# Patient Record
Sex: FEMALE | Race: BLACK OR AFRICAN AMERICAN | Employment: OTHER | URBAN - METROPOLITAN AREA
[De-identification: names, ages, dates, MRNs, and addresses within clinical notes are randomized per-mention and may not be internally consistent; named-entity substitution may affect disease eponyms.]

---

## 2017-01-11 LAB
T4 FREE SERPL-MCNC: 1.37 NG/DL (ref 0.82–1.77)
TSH SERPL DL<=0.005 MIU/L-ACNC: 0.04 UIU/ML (ref 0.45–4.5)

## 2017-03-01 DIAGNOSIS — E05.90 HYPERTHYROIDISM: ICD-10-CM

## 2017-03-01 DIAGNOSIS — E05.20 TOXIC MULTINODULAR GOITER: ICD-10-CM

## 2017-03-01 RX ORDER — METHIMAZOLE 5 MG/1
TABLET ORAL
Qty: 90 TAB | Refills: 6 | Status: SHIPPED | OUTPATIENT
Start: 2017-03-01 | End: 2017-10-10 | Stop reason: SDUPTHER

## 2017-03-07 ENCOUNTER — OFFICE VISIT (OUTPATIENT)
Dept: ENDOCRINOLOGY | Age: 72
End: 2017-03-07

## 2017-03-07 VITALS
RESPIRATION RATE: 16 BRPM | HEART RATE: 61 BPM | DIASTOLIC BLOOD PRESSURE: 75 MMHG | SYSTOLIC BLOOD PRESSURE: 133 MMHG | WEIGHT: 201.3 LBS | BODY MASS INDEX: 37.04 KG/M2 | HEIGHT: 62 IN | TEMPERATURE: 97.5 F

## 2017-03-07 DIAGNOSIS — I10 ESSENTIAL HYPERTENSION: ICD-10-CM

## 2017-03-07 DIAGNOSIS — E05.90 HYPERTHYROIDISM: Primary | ICD-10-CM

## 2017-03-07 DIAGNOSIS — E05.20 TOXIC MULTINODULAR GOITER: ICD-10-CM

## 2017-03-07 NOTE — PROGRESS NOTES
[facility] ENDOCRINOLOGY               Yuliya Barone MD        [address] [address] NE:[phone] Fax [phone]           Patient Information  Date:3/11/2017  Name : Michelle Long 81 y.o.     YOB: 1945         Referred by: Maggy Manning MD         Chief Complaint   Patient presents with    Thyroid Problem    Ultrasound       History of present illness    Michelle Long is a 81 y.o. female  here for fu  of thyroid. She was referred by Dr. Eloina Willingham for evaluation and management of hyperthyroidism and nodular goiter. She was found to have hyperthyroidism, ultrasound showed multinodular goiter, left dominant nodule  status post fine needle aspiration biopsy, which was benign by cytopathology. She is on Methimazole. US was last year     No change in the size of the neck    no fever or sore throat     No history of atrial fibrillation, known osteoporosis. No FH of thyroid cancer     Past Medical History:   Diagnosis Date    Arthritis     Hypertension     Hyperthyroidism        Current Outpatient Prescriptions   Medication Sig    methIMAzole (TAPAZOLE) 5 mg tablet TAKE 1 TABLET BY MOUTH EVERY MORNING    fluticasone (FLONASE) 50 mcg/actuation nasal spray 2 Sprays by Both Nostrils route once.  potassium chloride (K-DUR, KLOR-CON) 20 mEq tablet Take 20 mEq by mouth daily.  beclomethasone (QVAR) 80 mcg/actuation inhaler Take 1 Puff by inhalation two (2) times a day.  triamcinolone acetonide (KENALOG) 0.1 % ointment Apply  to affected area two (2) times a day. use thin layer    ALPRAZolam (XANAX) 0.5 mg tablet Take 1 Tab by mouth two (2) times a day. Max Daily Amount: 1 mg.  cetirizine (ZYRTEC) 10 mg tablet Take 10 mg by mouth daily.  amLODIPine (NORVASC) 5 mg tablet Take 5 mg by mouth daily.  losartan (COZAAR) 100 mg tablet Take 100 mg by mouth daily.  propranolol LA (INDERAL LA) 120 mg SR capsule Take 120 mg by mouth daily. No current facility-administered medications for this visit. Review of Systems:  - Constitutional Symptoms: no fevers, chills  - Eyes: no blurry vision or double vision  -   - Integumentary: no rashes  - Neurological: no numbness, tingling, or headaches  - Psychiatric: no depression or anxiety  - Endocrine:  no polyuria or polydipsia    Physical Examination:  - Blood pressure 133/75, pulse 61, temperature 97.5 °F (36.4 °C), temperature source Oral, resp. rate 16, height 5' 2\" (1.575 m), weight 201 lb 4.8 oz (91.3 kg). Body mass index is 36.82 kg/(m^2). - General: pleasant, no distress, good eye contact  - HEENT: no exopthalmos, no periorbital edema, no scleral/conjunctival injection, EOMI, no lid lag or stare  - Neck: supple, + thyromegaly,left  nodule,   - Cardiovascular: regular,  normal S1 and S2, no murmurs  - Respiratory: clear to auscultation bilaterally  - Gastrointestinal: soft, nontender, nondistended, BS +  - Musculoskeletal:  no tremors, no edema  - Neurological: alert and oriented   - Psychiatric: normal mood and affect  - Skin - normal turgor    Data Reviewed:       [] Reviewed labs    Assessment/Plan:     1. Hyperthyroidism    2. Toxic multinodular goiter    3. Essential hypertension        Toxic MNG   Methimazole 5 mg   Left dominant nodule FNA benign by cytopathology , was done at [facility]- radioactive thyroid is a better option than Methimazole in her case. Declined CHAMBERS in the past   Left nodule has increased to 3 cm from 2.3 cm  Agreed for CHAMBERS   US 3/17  There are 3 dominant nodules - isthmus and 2 left nodules     Follow-up Disposition:  Return in about 3 months (around 6/7/2017). Thank you for allowing me to participate in the care of this patient.     Jeny Tan MD      Patient verbalized understanding

## 2017-03-07 NOTE — MR AVS SNAPSHOT
Visit Information Date & Time Provider Department Dept. Phone Encounter #  
 3/7/2017 10:00 AM Venus Singh MD [facility] [phone] [id number] Follow-up Instructions Return in about 3 months (around 6/7/2017). Upcoming Health Maintenance Date Due Hepatitis C Screening 1945 DTaP/Tdap/Td series (1 - Tdap) 3/5/1968 BREAST CANCER SCRN MAMMOGRAM 3/5/1997 FOBT Q 1 YEAR AGE 50-75 3/5/1997 ZOSTER VACCINE AGE 60> 3/5/2007 GLAUCOMA SCREENING Q2Y 3/5/2012 OSTEOPOROSIS SCREENING (DEXA) 3/5/2012 Pneumococcal 65+ Low/Medium Risk (1 of 2 - PCV13) 3/5/2012 MEDICARE YEARLY EXAM 3/5/2012 INFLUENZA AGE 9 TO ADULT 8/1/2016 Allergies as of 3/7/2017  Review Complete On: 3/7/2017 By: Venus Singh MD  
 No Known Allergies Current Immunizations  Never Reviewed No immunizations on file. Not reviewed this visit You Were Diagnosed With   
  
 Codes Comments Hyperthyroidism    -  Primary ICD-10-CM: E05.90 ICD-9-CM: 242.90 Toxic multinodular goiter     ICD-10-CM: E05.20 ICD-9-CM: 242.20 Vitals BP Pulse Temp Resp Height(growth percentile) Weight(growth percentile) 133/75 (BP 1 Location: Left arm, BP Patient Position: Sitting) 61 97.5 °F (36.4 °C) (Oral) 16 5' 2\" (1.575 m) 201 lb 4.8 oz (91.3 kg) BMI OB Status Smoking Status 36.82 kg/m2 Menopause Former Smoker Vitals History BMI and BSA Data Body Mass Index Body Surface Area  
 36.82 kg/m 2 2 m 2 Preferred Pharmacy Pharmacy Name Phone 99 [facility], [address] [place] [address] [phone] Your Updated Medication List  
  
   
This list is accurate as of: 3/7/17 11:47 AM.  Always use your most recent med list.  
  
  
  
  
 ALPRAZolam 0.5 mg tablet Commonly known as:  Alcus Sweeney Take 1 Tab by mouth two (2) times a day. Max Daily Amount: 1 mg. amLODIPine 5 mg tablet Commonly known as:  Sunil Wills Take 5 mg by mouth daily. beclomethasone 80 mcg/actuation inhaler Commonly known as:  QVAR Take 1 Puff by inhalation two (2) times a day. cetirizine 10 mg tablet Commonly known as:  ZYRTEC Take 10 mg by mouth daily. fluticasone 50 mcg/actuation nasal spray Commonly known as:  Luzmaria Fryer 2 Sprays by Both Nostrils route once. losartan 100 mg tablet Commonly known as:  COZAAR Take 100 mg by mouth daily. methIMAzole 5 mg tablet Commonly known as:  TAPAZOLE  
TAKE 1 TABLET BY MOUTH EVERY MORNING  
  
 potassium chloride 20 mEq tablet Commonly known as:  K-DUR, KLOR-CON Take 20 mEq by mouth daily. propranolol  mg SR capsule Commonly known as:  INDERAL LA Take 120 mg by mouth daily. triamcinolone acetonide 0.1 % ointment Commonly known as:  KENALOG Apply  to affected area two (2) times a day. use thin layer Follow-up Instructions Return in about 3 months (around 6/7/2017). Patient Instructions Radioactive Iodine: What to Expect at [facility] Your Recovery Radioactive iodine is absorbed and concentrated by the thyroid gland. You get it in liquid or pill form. The radiation will pass out of your body through your urine within days. Until that time, you will give off radiation in your sweat, your saliva, your urine, and anything else that comes out of your body. It is important to avoid exposing other people to the radioactivity from your body. Your doctor will give you more written instructions. Follow these carefully. The instructions will tell you how far to stay away from people and how long you need to follow the precautions listed below. They will list other ways to keep other people safe. They will also tell you when it will be safe to go out, go to work, and do other activities. How can you care for yourself at home? General recommendations · For a period of time, you will need to keep your distance from other people, especially young children and pregnant women. · Avoid close contact, kissing, and sexual activity. You may need to sleep in a separate bed from your partner. · Keep the toilet very clean. Men should urinate sitting down to avoid splashing. Flush the toilet 2 or 3 times after each use. Wash your hands well with soap and lots of water each time you use the toilet. · Rinse the bathroom sink and tub well after you use them. · Use separate towels, washcloths, and sheets. Wash these and your personal clothing by themselves. Don't wash them with other people's laundry. · You may want to use a special plastic trash bag for all your trash, such as bandages, paper or plastic dishes, menstrual pads, tissues, or paper towels. Talk to your treatment facility to see if they will handle the disposal. Or after 80 days, this bag can be thrown out with your other trash. · Wash your dishes in a  or by hand. If you use disposable dishes, they must be thrown away in the special plastic trash bag. · Don't cook for other people. If you must cook, use plastic gloves. Then throw them away in the special plastic trash bag. Don't share cups, dishes, or utensils. Pregnancy and children · Your doctor will tell you when it is safe to have sex and become pregnant. · You should not breastfeed your baby after you have been treated with radioactive iodine. Ask your doctor when it's safe to breastfeed. Travel · Don't take public transportation. If you are able, it's best to drive yourself. · It is important to prepare for any problems you may have at airport security. People who have had radioactive iodine treatment can set off the radiation detection machines in airports for a week to 10 days. Check with local authorities about any steps or permission you may need to travel.  
· If you plan to travel on the interstate, you may set off radiation detectors. Most police and transportation workers are aware of medical radiation, but it may help to carry some paperwork from your doctor. Follow-up care is a key part of your treatment and safety. Be sure to make and go to all appointments, and call your doctor if you are having problems. It's also a good idea to know your test results and keep a list of the medicines you take. When should you call for help? Watch closely for any changes in your health, and be sure to contact your doctor if: 
· You have a sore throat. · You vomit. · You have diarrhea. Where can you learn more? Go to http://tio-mago.info/. Enter S771 in the search box to learn more about \"Radioactive Iodine: What to Expect at Home. \" Current as of: July 28, 2016 Content Version: 11.1 © 8777-6671 [employer]. Care instructions adapted under license by [employer] (which disclaims liability or warranty for this information). If you have questions about a medical condition or this instruction, always ask your healthcare professional. [address] any warranty or liability for your use of this information. Thyroidectomy: Before Your Surgery What is a thyroidectomy? A thyroidectomy is surgery to take out your thyroid gland. This gland is shaped like a butterfly. It lies across the windpipe (trachea). The gland makes hormones that control how your body makes and uses energy (metabolism). A doctor removes the gland when it gets too big, does not work right, or has a tumor. Most tumors that grow in this gland are benign. This means they are not cancer. The doctor will take out the thyroid through a cut (incision) in the front of your neck. You will likely have a tube, called a drain, in your neck to let fluid out of the cut. The drain is most often taken out before you go home. You may go home on the same day.  Or you may stay one or more nights in the hospital. Butch Griffin may return to work or your normal routine in 1 to 2 weeks. This depends on whether you need more treatment and how you feel. It may also depend on the kind of work you do. Your doctor will check your incision in about a week. You may need to take thyroid medicine. If you have thyroid cancer, you may need to have radioactive iodine therapy. Your doctor will talk to you about what happens next. Follow-up care is a key part of your treatment and safety. Be sure to make and go to all appointments, and call your doctor if you are having problems. It's also a good idea to know your test results and keep a list of the medicines you take. What happens before surgery? Surgery can be stressful. This information will help you understand what you can expect. And it will help you safely prepare for surgery. Preparing for surgery · Understand exactly what surgery is planned, along with the risks, benefits, and other options. · Tell your doctors ALL the medicines, vitamins, supplements, and herbal remedies you take. Some of these can increase the risk of bleeding or interact with anesthesia. · If you take blood thinners, such as warfarin (Coumadin), clopidogrel (Plavix), or aspirin, be sure to talk to your doctor. He or she will tell you if you should stop taking these medicines before your surgery. Make sure that you understand exactly what your doctor wants you to do. · Your doctor will tell you which medicines to take or stop before your surgery. You may need to stop taking certain medicines a week or more before surgery. So talk to your doctor as soon as you can. · If you have an advance directive, let your doctor know. It may include a living will and a durable power of  for health care. Bring a copy to the hospital. If you don't have one, you may want to prepare one. It lets your doctor and loved ones know your health care wishes.  Doctors suggest that everyone prepare these papers before any type of surgery or procedure. What happens on the day of surgery? · Follow the instructions exactly about when to stop eating and drinking. If you don't, your surgery may be canceled. If your doctor told you to take your medicines on the day of surgery, take them with only a sip of water. · Take a bath or shower before you come in for your surgery. Do not apply lotions, perfumes, deodorants, or nail polish. · Do not shave the surgical site yourself. · Take off all jewelry and piercings. And take out contact lenses, if you wear them. At the hospital or surgery center · Bring a picture ID. · The area for surgery is often marked to make sure there are no errors. · You will be kept comfortable and safe by your anesthesia provider. You will be asleep during the surgery. · How long your surgery takes depends on how complex it is. Going home · Be sure you have someone to drive you home. Anesthesia and pain medicine make it unsafe for you to drive. · You will be given more specific instructions about recovering from your surgery. They will cover things like diet, wound care, follow-up care, driving, and getting back to your normal routine. When should you call your doctor? · You have questions or concerns. · You don't understand how to prepare for your surgery. · You become ill before the surgery (such as fever, flu, or a cold). · You need to reschedule or have changed your mind about having the surgery. Where can you learn more? Go to http://tio-mago.info/. Enter I132 in the search box to learn more about \"Thyroidectomy: Before Your Surgery. \" Current as of: July 28, 2016 Content Version: 11.1 © 0164-4099 [employer]. Care instructions adapted under license by [employer] (which disclaims liability or warranty for this information).  If you have questions about a medical condition or this instruction, always ask your healthcare professional. [address] any warranty or liability for your use of this information. Introducing [facility]! Basilia Mustafa introduces [employer]PSoftware patient portal. Now you can access parts of your medical record, email your doctor's office, and request medication refills online. 1. In your internet browser, go to [email or web address] 2. Click on the First Time User? Click Here link in the Sign In box. You will see the New Member Sign Up page. 3. Enter your [employer]PSoftware Access Code exactly as it appears below. You will not need to use this code after youve completed the sign-up process. If you do not sign up before the expiration date, you must request a new code. · [employer] Access Code: [email or web address] Expires: 6/5/2017 11:47 AM 
 
4. Enter the last four digits of your Social Security Number (xxxx) and Date of Birth (mm/dd/yyyy) as indicated and click Submit. You will be taken to the next sign-up page. 5. Create a [employer]PSoftware ID. This will be your [employer]Software login ID and cannot be changed, so think of one that is secure and easy to remember. 6. Create a [employer] password. You can change your password at any time. 7. Enter your Password Reset Question and Answer. This can be used at a later time if you forget your password. 8. Enter your e-mail address. You will receive e-mail notification when new information is available in [address] 9. Click Sign Up. You can now view and download portions of your medical record. 10. Click the Download Summary menu link to download a portable copy of your medical information. If you have questions, please visit the Frequently Asked Questions section of the [employer] website. Remember, [employer]Software is NOT to be used for urgent needs. For medical emergencies, dial 911. Now available from your iPhone and Android! Please provide this summary of care documentation to your next provider. Your primary care clinician is listed as Jl Carrillo If you have any questions after today's visit, please call [phone].

## 2017-03-07 NOTE — PROGRESS NOTES
Iglesia Simon is a 81 y.o. female here for   Chief Complaint   Patient presents with    Thyroid Problem    Ultrasound       Wt Readings from Last 3 Encounters:   09/14/16 196 lb (88.9 kg)   06/09/16 198 lb (89.8 kg)   04/04/16 187 lb 1.6 oz (84.9 kg)     Temp Readings from Last 3 Encounters:   09/14/16 96.8 °F (36 °C)   06/09/16 97.3 °F (36.3 °C)   04/04/16 96.5 °F (35.8 °C) (Oral)     BP Readings from Last 3 Encounters:   09/14/16 143/84   06/09/16 150/79   04/04/16 142/71     Pulse Readings from Last 3 Encounters:   09/14/16 65   06/09/16 66   04/04/16 77

## 2017-03-07 NOTE — PATIENT INSTRUCTIONS
Radioactive Iodine: What to Expect at Home  Your Recovery  Radioactive iodine is absorbed and concentrated by the thyroid gland. You get it in liquid or pill form. The radiation will pass out of your body through your urine within days. Until that time, you will give off radiation in your sweat, your saliva, your urine, and anything else that comes out of your body. It is important to avoid exposing other people to the radioactivity from your body. Your doctor will give you more written instructions. Follow these carefully. The instructions will tell you how far to stay away from people and how long you need to follow the precautions listed below. They will list other ways to keep other people safe. They will also tell you when it will be safe to go out, go to work, and do other activities. How can you care for yourself at home? General recommendations  · For a period of time, you will need to keep your distance from other people, especially young children and pregnant women. · Avoid close contact, kissing, and sexual activity. You may need to sleep in a separate bed from your partner. · Keep the toilet very clean. Men should urinate sitting down to avoid splashing. Flush the toilet 2 or 3 times after each use. Wash your hands well with soap and lots of water each time you use the toilet. · Rinse the bathroom sink and tub well after you use them. · Use separate towels, washcloths, and sheets. Wash these and your personal clothing by themselves. Don't wash them with other people's laundry. · You may want to use a special plastic trash bag for all your trash, such as bandages, paper or plastic dishes, menstrual pads, tissues, or paper towels. Talk to your treatment facility to see if they will handle the disposal. Or after 80 days, this bag can be thrown out with your other trash. · Wash your dishes in a  or by hand.  If you use disposable dishes, they must be thrown away in the special plastic trash bag. · Don't cook for other people. If you must cook, use plastic gloves. Then throw them away in the special plastic trash bag. Don't share cups, dishes, or utensils. Pregnancy and children  · Your doctor will tell you when it is safe to have sex and become pregnant. · You should not breastfeed your baby after you have been treated with radioactive iodine. Ask your doctor when it's safe to breastfeed. Travel  · Don't take public transportation. If you are able, it's best to drive yourself. · It is important to prepare for any problems you may have at airport security. People who have had radioactive iodine treatment can set off the radiation detection machines in airports for a week to 10 days. Check with local authorities about any steps or permission you may need to travel. · If you plan to travel on the interstate, you may set off radiation detectors. Most police and transportation workers are aware of medical radiation, but it may help to carry some paperwork from your doctor. Follow-up care is a key part of your treatment and safety. Be sure to make and go to all appointments, and call your doctor if you are having problems. It's also a good idea to know your test results and keep a list of the medicines you take. When should you call for help? Watch closely for any changes in your health, and be sure to contact your doctor if:  · You have a sore throat. · You vomit. · You have diarrhea. Where can you learn more? Go to http://tio-mago.info/. Enter J107 in the search box to learn more about \"Radioactive Iodine: What to Expect at Home. \"  Current as of: July 28, 2016  Content Version: 11.1  © 3985-4360 [employer]. Care instructions adapted under license by [employer] (which disclaims liability or warranty for this information).  If you have questions about a medical condition or this instruction, always ask your healthcare professional. [employer], [facility] disclaims any warranty or liability for your use of this information. Thyroidectomy: Before Your Surgery  What is a thyroidectomy? A thyroidectomy is surgery to take out your thyroid gland. This gland is shaped like a butterfly. It lies across the windpipe (trachea). The gland makes hormones that control how your body makes and uses energy (metabolism). A doctor removes the gland when it gets too big, does not work right, or has a tumor. Most tumors that grow in this gland are benign. This means they are not cancer. The doctor will take out the thyroid through a cut (incision) in the front of your neck. You will likely have a tube, called a drain, in your neck to let fluid out of the cut. The drain is most often taken out before you go home. You may go home on the same day. Or you may stay one or more nights in the hospital. You may return to work or your normal routine in 1 to 2 weeks. This depends on whether you need more treatment and how you feel. It may also depend on the kind of work you do. Your doctor will check your incision in about a week. You may need to take thyroid medicine. If you have thyroid cancer, you may need to have radioactive iodine therapy. Your doctor will talk to you about what happens next. Follow-up care is a key part of your treatment and safety. Be sure to make and go to all appointments, and call your doctor if you are having problems. It's also a good idea to know your test results and keep a list of the medicines you take. What happens before surgery? Surgery can be stressful. This information will help you understand what you can expect. And it will help you safely prepare for surgery. Preparing for surgery  · Understand exactly what surgery is planned, along with the risks, benefits, and other options. · Tell your doctors ALL the medicines, vitamins, supplements, and herbal remedies you take.  Some of these can increase the risk of bleeding or interact with anesthesia. · If you take blood thinners, such as warfarin (Coumadin), clopidogrel (Plavix), or aspirin, be sure to talk to your doctor. He or she will tell you if you should stop taking these medicines before your surgery. Make sure that you understand exactly what your doctor wants you to do. · Your doctor will tell you which medicines to take or stop before your surgery. You may need to stop taking certain medicines a week or more before surgery. So talk to your doctor as soon as you can. · If you have an advance directive, let your doctor know. It may include a living will and a durable power of  for health care. Bring a copy to the hospital. If you don't have one, you may want to prepare one. It lets your doctor and loved ones know your health care wishes. Doctors suggest that everyone prepare these papers before any type of surgery or procedure. What happens on the day of surgery? · Follow the instructions exactly about when to stop eating and drinking. If you don't, your surgery may be canceled. If your doctor told you to take your medicines on the day of surgery, take them with only a sip of water. · Take a bath or shower before you come in for your surgery. Do not apply lotions, perfumes, deodorants, or nail polish. · Do not shave the surgical site yourself. · Take off all jewelry and piercings. And take out contact lenses, if you wear them. At the hospital or surgery center  · Bring a picture ID. · The area for surgery is often marked to make sure there are no errors. · You will be kept comfortable and safe by your anesthesia provider. You will be asleep during the surgery. · How long your surgery takes depends on how complex it is. Going home  · Be sure you have someone to drive you home. Anesthesia and pain medicine make it unsafe for you to drive. · You will be given more specific instructions about recovering from your surgery.  They will cover things like diet, wound care, follow-up care, driving, and getting back to your normal routine. When should you call your doctor? · You have questions or concerns. · You don't understand how to prepare for your surgery. · You become ill before the surgery (such as fever, flu, or a cold). · You need to reschedule or have changed your mind about having the surgery. Where can you learn more? Go to http://tio-mago.info/. Enter B129 in the search box to learn more about \"Thyroidectomy: Before Your Surgery. \"  Current as of: July 28, 2016  Content Version: 11.1  © 5422-6112 [employer]. Care instructions adapted under license by [employer] (which disclaims liability or warranty for this information). If you have questions about a medical condition or this instruction, always ask your healthcare professional. [address] any warranty or liability for your use of this information.

## 2017-03-11 PROBLEM — I10 ESSENTIAL HYPERTENSION: Status: ACTIVE | Noted: 2017-03-11

## 2017-03-11 NOTE — PROGRESS NOTES
Thyroid Ultrasound Report    Patient Information  Date:3/11/2017  Name : Brunilda Rhodes 81 y.o.     YOB: 1945         Referred by: Venkat Barron MD , MD    Indication: Thyroid nodule/    Multiple real time longitudinal and transverse images were obtained using a high  resolution ultrasound with a Linear transducer. Right thyroid lobe measures 3.8 x 1.3 x 1.6 cm. There is an an-echoic nodule measuring 1 x 0.7 cm consistent with a thyroid cyst.  Isthmus has a  2.4 x 1.9 cm nodule which is heterogenous, no increased vascularity. In the left lobe there are two nodules measuring 2.6 x 1.8 x 3 cm, this nodule was 2.3 cm in 2016. The other nodule measures 2.7 x 1.6 cm, no increased vascularity, no microcalcification seen. Impression:   Multinodular goiter with dominant nodules. Left nodule has increased in size, she has hyperthyroidism and could be autonomous nodule. Will plan  radioactive iodine therapy.           Anderson Tidwell MD

## 2017-03-22 ENCOUNTER — TELEPHONE (OUTPATIENT)
Dept: ENDOCRINOLOGY | Age: 72
End: 2017-03-22

## 2017-03-22 DIAGNOSIS — E05.20 TOXIC MULTINODULAR GOITER: Primary | ICD-10-CM

## 2017-03-22 NOTE — TELEPHONE ENCOUNTER
Ordered iodine treatment     Ask patient to stop methimazole for 5 days before the scan and continue to stay off

## 2017-03-22 NOTE — TELEPHONE ENCOUNTER
Patient called she is willing to start treatment for her Thyroid.  Please get things started  Thank you

## 2017-03-24 NOTE — TELEPHONE ENCOUNTER
Informed pt that Dr Priscilla Trinidad has ordered the scan and someone will contact her to schedule. Asked pt to stop Methimazole 5 days before her scheduled scan and continue to stay off methimazole until further notice. Pt verbalized understanding.

## 2017-03-31 ENCOUNTER — TELEPHONE (OUTPATIENT)
Dept: ENDOCRINOLOGY | Age: 72
End: 2017-03-31

## 2017-03-31 NOTE — TELEPHONE ENCOUNTER
Informed pt that test was ordered and someone from [facility] tried to contact her. Pt states her home phone is not working. Gave pt 's number and asked pt to call and schedule. Pt verbalized understanding.

## 2017-05-18 ENCOUNTER — TELEPHONE (OUTPATIENT)
Dept: ENDOCRINOLOGY | Age: 72
End: 2017-05-18

## 2017-05-18 NOTE — TELEPHONE ENCOUNTER
----- Message from Nu Seymour sent at 5/18/2017  1:32 PM EDT -----  Regarding: Dr. Nga Carnes  Pt. is requesting a call back regarding Iodine Pill and radiology test. Also needs a referral for testing. Best contact number for pt.  is [phone] or [phone].

## 2017-05-18 NOTE — TELEPHONE ENCOUNTER
Pt stated Dr. Leslie Conklin needs to know exactly what pt is having done so she can fill out the referral for the pt.

## 2017-05-18 NOTE — TELEPHONE ENCOUNTER
Radioactive iodine therapy for Toxic multinodular goiter    Usually Anita Hoskins will get the approval from insurance

## 2017-06-08 ENCOUNTER — HOSPITAL ENCOUNTER (OUTPATIENT)
Dept: NUCLEAR MEDICINE | Age: 72
Discharge: HOME | End: 2017-06-08
Attending: INTERNAL MEDICINE
Payer: MEDICARE

## 2017-06-08 DIAGNOSIS — E05.20 TOXIC MULTINODULAR GOITER: ICD-10-CM

## 2017-06-09 ENCOUNTER — HOSPITAL ENCOUNTER (OUTPATIENT)
Dept: ULTRASOUND IMAGING | Age: 72
Discharge: HOME | End: 2017-06-09
Attending: INTERNAL MEDICINE
Payer: MEDICARE

## 2017-06-09 ENCOUNTER — HOSPITAL ENCOUNTER (OUTPATIENT)
Dept: NUCLEAR MEDICINE | Age: 72
Discharge: HOME | End: 2017-06-09
Attending: INTERNAL MEDICINE
Payer: MEDICARE

## 2017-06-09 DIAGNOSIS — E05.90 HYPERTHYROIDISM: ICD-10-CM

## 2017-06-09 PROCEDURE — 78014 THYROID IMAGING W/BLOOD FLOW: CPT

## 2017-06-16 ENCOUNTER — HOSPITAL ENCOUNTER (OUTPATIENT)
Dept: NUCLEAR MEDICINE | Age: 72
Discharge: HOME | End: 2017-06-16
Attending: INTERNAL MEDICINE
Payer: MEDICARE

## 2017-06-16 DIAGNOSIS — E05.20 TOXIC MULTINODULAR GOITER: ICD-10-CM

## 2017-06-16 PROCEDURE — 79005 NUCLEAR RX ORAL ADMIN: CPT

## 2017-06-19 ENCOUNTER — TELEPHONE (OUTPATIENT)
Dept: ENDOCRINOLOGY | Age: 72
End: 2017-06-19

## 2017-06-30 ENCOUNTER — DOCUMENTATION ONLY (OUTPATIENT)
Dept: ENDOCRINOLOGY | Age: 72
End: 2017-06-30

## 2017-06-30 NOTE — PROGRESS NOTES
Pts call     Patient is itchy and hot because of radiation for thyroid what can she do [phone]      Take Claritin 10 mg daily , OTC - have her complete labs and may need thyroid medication , but have to wait for blood tests

## 2017-06-30 NOTE — PROGRESS NOTES
Informed pt of Dr. Belen Sims note. Pt verbalized understanding with no further questions or concerns at this time. Anurag Rodriguez

## 2017-07-11 ENCOUNTER — OFFICE VISIT (OUTPATIENT)
Dept: ENDOCRINOLOGY | Age: 72
End: 2017-07-11

## 2017-07-11 VITALS
HEART RATE: 80 BPM | OXYGEN SATURATION: 92 % | DIASTOLIC BLOOD PRESSURE: 69 MMHG | SYSTOLIC BLOOD PRESSURE: 129 MMHG | RESPIRATION RATE: 14 BRPM | HEIGHT: 62 IN | BODY MASS INDEX: 36.91 KG/M2 | WEIGHT: 200.6 LBS

## 2017-07-11 DIAGNOSIS — E06.9 THYROIDITIS: ICD-10-CM

## 2017-07-11 DIAGNOSIS — E05.20 TOXIC MULTINODULAR GOITER: Primary | ICD-10-CM

## 2017-07-11 DIAGNOSIS — E05.90 HYPERTHYROIDISM: ICD-10-CM

## 2017-07-11 LAB
T4 FREE SERPL-MCNC: 3.21 NG/DL (ref 0.82–1.77)
TSH SERPL DL<=0.005 MIU/L-ACNC: <0.006 UIU/ML (ref 0.45–4.5)

## 2017-07-11 RX ORDER — LOSARTAN POTASSIUM AND HYDROCHLOROTHIAZIDE 12.5; 1 MG/1; MG/1
1 TABLET ORAL DAILY
COMMUNITY

## 2017-07-11 RX ORDER — SERTRALINE HYDROCHLORIDE 50 MG/1
TABLET, FILM COATED ORAL DAILY
COMMUNITY

## 2017-07-11 RX ORDER — PRAVASTATIN SODIUM 20 MG/1
20 TABLET ORAL
COMMUNITY

## 2017-07-11 RX ORDER — ALBUTEROL SULFATE 90 UG/1
AEROSOL, METERED RESPIRATORY (INHALATION)
COMMUNITY
End: 2017-07-11 | Stop reason: SDUPTHER

## 2017-07-11 RX ORDER — ALBUTEROL SULFATE 90 UG/1
2 AEROSOL, METERED RESPIRATORY (INHALATION)
Qty: 1 INHALER | Refills: 1 | Status: SHIPPED | OUTPATIENT
Start: 2017-07-11

## 2017-07-11 NOTE — PROGRESS NOTES
[facility] ENDOCRINOLOGY               Gustavo Pool MD        [address] [address] XJ:[phone] Fax [phone]           Patient Information  Date:7/15/2017  Name : Shi Landis 81 y.o.     YOB: 1945         Referred by: Cristobal Guadalupe MD         Chief Complaint   Patient presents with    Thyroid Problem       History of present illness    Shi Landis is a 81 y.o. female  here for fu  of thyroid. She was referred by Dr. Asha Ignacio for evaluation and management of hyperthyroidism and nodular goiter. She was found to have hyperthyroidism, ultrasound showed multinodular goiter, left dominant nodule  status post fine needle aspiration biopsy, which was benign by cytopathology. She is on Methimazole. Underwent CHAMBERS therapy   Has more pain in the neck, nervousness    No change in the size of the neck    no fever    No history of atrial fibrillation, known osteoporosis. No FH of thyroid cancer     Past Medical History:   Diagnosis Date    Arthritis     Hypertension     Hyperthyroidism        Current Outpatient Prescriptions   Medication Sig    pravastatin (PRAVACHOL) 20 mg tablet Take 20 mg by mouth nightly.  sertraline (ZOLOFT) 50 mg tablet Take  by mouth daily.  losartan-hydroCHLOROthiazide (HYZAAR) 100-12.5 mg per tablet Take 1 Tab by mouth daily.  methIMAzole (TAPAZOLE) 5 mg tablet TAKE 1 TABLET BY MOUTH EVERY MORNING    fluticasone (FLONASE) 50 mcg/actuation nasal spray 2 Sprays by Both Nostrils route once.  potassium chloride (K-DUR, KLOR-CON) 20 mEq tablet Take 20 mEq by mouth daily.  ALPRAZolam (XANAX) 0.5 mg tablet Take 1 Tab by mouth two (2) times a day. Max Daily Amount: 1 mg.  cetirizine (ZYRTEC) 10 mg tablet Take 10 mg by mouth daily.  amLODIPine (NORVASC) 5 mg tablet Take 5 mg by mouth daily.  propranolol LA (INDERAL LA) 120 mg SR capsule Take 120 mg by mouth daily.     albuterol (VENTOLIN HFA) 90 mcg/actuation inhaler Take 2 Puffs by inhalation every six (6) hours as needed for Wheezing.  beclomethasone (QVAR) 80 mcg/actuation inhaler Take 1 Puff by inhalation two (2) times a day.  triamcinolone acetonide (KENALOG) 0.1 % ointment Apply  to affected area two (2) times a day. use thin layer     No current facility-administered medications for this visit. Review of Systems:  - Constitutional Symptoms: no fevers, chills  - Eyes: no blurry vision or double vision  -   - Integumentary: no rashes  - Neurological: no numbness, tingling, or headaches  - Psychiatric: no depression or anxiety  - Endocrine:  no polyuria or polydipsia    Physical Examination:  - Blood pressure 129/69, pulse 80, resp. rate 14, height 5' 2\" (1.575 m), weight 200 lb 9.6 oz (91 kg), SpO2 92 %. Body mass index is 36.69 kg/(m^2). - General: pleasant, no distress, good eye contact  - HEENT: no exopthalmos, no periorbital edema, no scleral/conjunctival injection, EOMI, no lid lag or stare  - Neck: supple, + thyromegaly,left  nodule,   - Cardiovascular: regular,  normal S1 and S2, no murmurs  - Respiratory: clear to auscultation bilaterally  - Gastrointestinal: soft, nontender, nondistended, BS +  - Musculoskeletal:  no tremors, no edema  - Neurological: alert and oriented   - Psychiatric: normal mood and affect  - Skin - normal turgor    Data Reviewed:       [] Reviewed labs    Assessment/Plan:     1. Toxic multinodular goiter    2. Hyperthyroidism        Toxic MNG   Methimazole 10 mg   Left dominant nodule FNA benign by cytopathology , was done at [facility]-  S/p  CHAMBERS 6/17  Left nodule has increased to 3 cm from 2.3 cm     US 3/17  There are 3 dominant nodules - isthmus and 2 left nodules     Radioactive iodine induced thyroiditis vs due to stopping Methimazole for a month   She had postponed CHAMBERS which was scheduled   Increase methimazole ,analgesics       Follow-up Disposition:  Return in about 4 months (around 11/11/2017).     Thank you for allowing me to participate in the care of this patient.     Lina Jones MD      Patient verbalized understanding

## 2017-07-11 NOTE — PROGRESS NOTES
Lab Results   Component Value Date/Time    TSH <0.006 07/10/2017 04:49 PM    T4, Free 3.21 07/10/2017 04:49 PM     Wt Readings from Last 3 Encounters:   07/11/17 200 lb 9.6 oz (91 kg)   03/07/17 201 lb 4.8 oz (91.3 kg)   09/14/16 196 lb (88.9 kg)     Temp Readings from Last 3 Encounters:   03/07/17 97.5 °F (36.4 °C) (Oral)   09/14/16 96.8 °F (36 °C)   06/09/16 97.3 °F (36.3 °C)     BP Readings from Last 3 Encounters:   07/11/17 129/69   03/07/17 133/75   09/14/16 143/84     Pulse Readings from Last 3 Encounters:   07/11/17 80   03/07/17 61   09/14/16 65

## 2017-07-11 NOTE — MR AVS SNAPSHOT
Visit Information Date & Time Provider Department Dept. Phone Encounter #  
 7/11/2017  2:45 PM Emy Castañeda MD [facility] [phone] [id number] Follow-up Instructions Return in about 4 months (around 11/11/2017). Upcoming Health Maintenance Date Due Hepatitis C Screening 1945 DTaP/Tdap/Td series (1 - Tdap) 3/5/1968 BREAST CANCER SCRN MAMMOGRAM 3/5/1997 FOBT Q 1 YEAR AGE 50-75 3/5/1997 ZOSTER VACCINE AGE 60> 3/5/2007 GLAUCOMA SCREENING Q2Y 3/5/2012 OSTEOPOROSIS SCREENING (DEXA) 3/5/2012 Pneumococcal 65+ Low/Medium Risk (1 of 2 - PCV13) 3/5/2012 MEDICARE YEARLY EXAM 3/5/2012 INFLUENZA AGE 9 TO ADULT 8/1/2017 Allergies as of 7/11/2017  Review Complete On: 7/11/2017 By: Emy Castañeda MD  
  
 Severity Noted Reaction Type Reactions Ciprofloxacin  07/11/2017    Diarrhea Novocain [Procaine]  07/11/2017    Itching Current Immunizations  Never Reviewed No immunizations on file. Not reviewed this visit You Were Diagnosed With   
  
 Codes Comments Toxic multinodular goiter    -  Primary ICD-10-CM: E05.20 ICD-9-CM: 242.20 Hyperthyroidism     ICD-10-CM: E05.90 ICD-9-CM: 242.90 Vitals BP Pulse Resp Height(growth percentile) Weight(growth percentile) SpO2  
 129/69 (BP 1 Location: Left arm, BP Patient Position: Sitting) 80 14 5' 2\" (1.575 m) 200 lb 9.6 oz (91 kg) 92% BMI OB Status Smoking Status 36.69 kg/m2 Menopause Former Smoker Vitals History BMI and BSA Data Body Mass Index Body Surface Area  
 36.69 kg/m 2 2 m 2 Preferred Pharmacy Pharmacy Name Phone [facility], [address] [place] [address] [phone] Your Updated Medication List  
  
   
This list is accurate as of: 7/11/17  3:46 PM.  Always use your most recent med list.  
  
  
  
  
 ALPRAZolam 0.5 mg tablet Commonly known as:  Uzair Gentile Take 1 Tab by mouth two (2) times a day. Max Daily Amount: 1 mg. amLODIPine 5 mg tablet Commonly known as:  Manny Matar Take 5 mg by mouth daily. beclomethasone 80 mcg/actuation [employer] Commonly known as:  QVAR Take 1 Puff by inhalation two (2) times a day. cetirizine 10 mg tablet Commonly known as:  ZYRTEC Take 10 mg by mouth daily. fluticasone 50 mcg/actuation nasal spray Commonly known as:  Indu Courser 2 Sprays by Both Nostrils route once. losartan-hydroCHLOROthiazide 100-12.5 mg per tablet Commonly known as:  HYZAAR Take 1 Tab by mouth daily. methIMAzole 5 mg tablet Commonly known as:  TAPAZOLE  
TAKE 1 TABLET BY MOUTH EVERY MORNING  
  
 potassium chloride 20 mEq tablet Commonly known as:  K-DUR, KLOR-CON Take 20 mEq by mouth daily. pravastatin 20 mg tablet Commonly known as:  PRAVACHOL Take 20 mg by mouth nightly. propranolol  mg SR capsule Commonly known as:  INDERAL LA Take 120 mg by mouth daily. sertraline 50 mg tablet Commonly known as:  ZOLOFT Take  by mouth daily. triamcinolone acetonide 0.1 % ointment Commonly known as:  KENALOG Apply  to affected area two (2) times a day. use thin layer VENTOLIN HFA 90 mcg/actuation inhaler Generic drug:  albuterol Take  by inhalation. Follow-up Instructions Return in about 4 months (around 11/11/2017). Patient Instructions Methimazole 5 mg 2 tablets for a month and then decrease to 1 tablet Please provide this summary of care documentation to your next provider. Your primary care clinician is listed as Patricia Dickson. If you have any questions after today's visit, please call [phone].

## 2017-07-15 PROBLEM — E06.9 THYROIDITIS: Status: ACTIVE | Noted: 2017-07-15

## 2017-08-18 NOTE — TELEPHONE ENCOUNTER
8/18/2017 1:19 PM 
 
Patient:  Quita Monge YOB: 1966 Date of Visit: 8/18/2017 Dear Carolyn Godoy MD 
[facility] [address] [place] [address] VIA Facsimile: [phone] Charisse Gamez PA-C 
[address] [place] [address] VIA In Basket 
 : Thank you for referring Ms. Latia Bundy to me for evaluation/treatment. Below are the relevant portions of my assessment and plan of care. [facility] Neuroscience [address] [place], [address] 
[phone]      [place] [address] Neurophysiology Report Patient: Preston Flores    
ID: [id number] Physician: Pancho Rand. Quincy Blanco MD  
Gender: Female Ref Phys: HAMZAH Carbajal Handedness:     
Study Date: August 18, 2017 Patient History: This 82-year-old woman has been complaining of numbness of digits 4 and 5 on both hands for the last several months. On brief exam she has tenderness of the ulnar nerve across the elbow bilaterally. Decreased sensation to pinprick in digits #4 and 5 bilaterally. Nerve Conduction Studies Anti Sensory Summary Table Stim Site NR Peak (ms) Norm Peak (ms) O-P Amp (µV) Norm O-P Amp Dist (cm) Michael (m/s) Left Median 2nd Digit Anti Sensory (2nd Digit) Wrist    3.1 <3.5 22.4 >20 13.0 52.0 Site 2    3.1  23.3 Right Median 2nd Digit Anti Sensory (2nd Digit) Wrist    3.2 <3.5 26.7 >20 13.0 54.2 Site 2    3.2  26.9 Left Ulnar Anti Sensory (5th Digit ) Wrist    3.0 <3.1 4.6 >17.0 11.0 44.0 Site 2    3.3  1.7 Site 3    3.1  5.2 Site 4    3.0  4.7 Site 5    2.9  2.7 Site 6    3.2  4.2 Right Ulnar Anti Sensory (5th Digit ) Wrist    3.4 <3.1 0.4 >17.0 11.0 39.3 Site 2    3.4  0.8 Site 3    3.2  0.7 Motor Summary Table Stim Site NR Onset (ms) Norm Onset (ms) O-P Amp (mV) Norm O-P Amp Dist (cm) Michael (m/s) Norm Michael (m/s) Left Median Motor (Abd Poll Brev) Please fax lab sheet to [facility] in [place]. Then call when complete so she can go and get done before her visit.  Thank you Wrist    4.2 <4.4 8.0 >4.0 19.0 50.0 >49 Elbow    8.0  6.5 Right Median Motor (Abd Poll Brev) Wrist    3.4 <4.4 8.9 >4.0 20.0 55.6 >49 Elbow    7.0  9.0 Left Ulnar Motor (Abd Dig Minimi ) Wrist    2.8 <3.3 3.6 >6.0 20.0 51.3 >49 B Elbow    6.7  3.2  10.0 28.6 >50 A Elbow    10.2  3.0 Right Ulnar Motor (Abd Dig Minimi ) Wrist    2.9 <3.3 3.6 >6.0 19.0 45.2 >49 B Elbow    7.1  1.6  10.0 28.6 >50 A Elbow    10.6  2.4 EMG Side Muscle Nerve Root Ins Act Fibs Psw Fasc Amp Dur Poly Recrt Int Violet Isela Comment Right Deltoid Axillary C5-6 Nml Nml Nml None Nml Nml 0 Nml Nml Right Biceps Musculocut C5-6 Nml Nml Nml None Nml Nml 0 Nml Nml Right Triceps Radial C6-7-8 Nml Nml Nml None Nml Nml 0 Nml Nml Right FlexCarRad Median C6-7 Nml Nml Nml None Nml Nml 0 Nml Nml Right 1stDorInt Ulnar C8-T1 Nml Nml Nml None Nml Nml 0 Nml Nml Right Abd Poll Brev Median C8-T1 Nml Nml Nml None Nml Nml 0 Nml Nml Right Cervical Parasp Up Rami C1-3 Nml Nml Nml Right Cervical Parasp Mid Rami C4-6 Nml Nml Nml Right Cervical Parasp Low Rami C7-8 Nml Nml Nml NCS/EMG FINDINGS: 
 
? Evaluation of the Left median motor, the Right median motor, the Left Median 2nd Digit sensory, and the Right Median 2nd Digit sensory nerves were unremarkable. ? The Left ulnar motor nerve showed reduced amplitude (3.6 mV) and decreased conduction velocity (A Elbow-B Elbow, 28.6 m/s). ? The Right ulnar motor nerve showed reduced amplitude (3.6 mV), decreased conduction velocity (B Elbow-Wrist, 45.2 m/s), and decreased conduction velocity (A Elbow-B Elbow, 28.6 m/s). ? The Left ulnar sensory nerve showed reduced amplitude (4.6 µV) and decreased conduction velocity (Wrist-5th Digit, 44.0 m/s). ? The Right ulnar sensory nerve showed prolonged distal peak latency (3.4 ms), reduced amplitude (0.4 µV), and decreased conduction velocity (Wrist-5th Digit, 39.3 m/s). INTERPRETATION: This was an abnormal nerve conduction and EMG study showing slowing of the motor conduction of the ulnar nerve across the elbow bilaterally. 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 No sign of diffuse neuropathy was identified. ___________________________ Elkin Melchor MD 
 
 
 
 
Waveforms: If you have questions, please do not hesitate to call me. I look forward to following Ms. Sanders along with you.  
 
 
 
Sincerely, 
 
 
Sonia Scott MD

## 2017-09-09 LAB
T4 FREE SERPL-MCNC: 0.43 NG/DL (ref 0.82–1.77)
TSH SERPL DL<=0.005 MIU/L-ACNC: 40.7 UIU/ML (ref 0.45–4.5)

## 2017-09-15 ENCOUNTER — TELEPHONE (OUTPATIENT)
Dept: ENDOCRINOLOGY | Age: 72
End: 2017-09-15

## 2017-10-06 LAB
CREATININE, EXTERNAL: 0.8
HBA1C MFR BLD HPLC: 5.4 %
LDL-C, EXTERNAL: 164
MICROALBUMIN UR TEST STR-MCNC: 11.9 MG/DL

## 2017-10-10 DIAGNOSIS — E05.20 TOXIC MULTINODULAR GOITER: ICD-10-CM

## 2017-10-10 DIAGNOSIS — E05.90 HYPERTHYROIDISM: Primary | ICD-10-CM

## 2017-10-10 RX ORDER — METHIMAZOLE 10 MG/1
10 TABLET ORAL DAILY
Qty: 90 TAB | Refills: 3 | OUTPATIENT
Start: 2017-10-10

## 2017-10-10 NOTE — TELEPHONE ENCOUNTER
Informed pt she was supposed to stop methimazole for 4 weeks then have labs drawn. Pt states she stopped methimazole for 2 weeks then started to feel terrible. She states she began to sweat and she could not \"get it together\" and did not have energy. She states she began taking 1 pill. Informed pt she needs labs drawn. Pt states fax orders to [facility]. Pt states she wants 5 days supply sent to  Local pharmacy because she needs the medication to feel better. Asked pt to have labs drawn ASAP. Pt verbalized understanding.

## 2017-10-10 NOTE — TELEPHONE ENCOUNTER
Please see the note - Miriam you had talked to her in September and asked to stop Methimazole , repeat labs in 4 weeks.  Margaret Strauss is she still taking Methimazole and need labs ASAP as she may be hypothyroid

## 2017-10-10 NOTE — TELEPHONE ENCOUNTER
Patient says methimazole 10mg new prescription sent to [address] Patient says she has been without medication 2 days now and feeling off.

## 2017-10-10 NOTE — TELEPHONE ENCOUNTER
She was hypothyroid according to the tests in the past - this will make it worse and I have to wait for lab work     So cannot send

## 2017-10-10 NOTE — TELEPHONE ENCOUNTER
----- Message from Emely Nur sent at 10/10/2017 11:55 AM EDT -----  Patient says she has lab orders and will get lab work done 1 week prior to appointment  ----- Message -----     From: Arianne Salguero LPN     Sent: [id number]/6/8780  12:17 PM       To: Emely Nur    Pt needs lab appointment [facility] per Dr Susan Porter

## 2017-10-11 RX ORDER — METHIMAZOLE 5 MG/1
TABLET ORAL
Qty: 7 TAB | Refills: 0 | OUTPATIENT
Start: 2017-10-11

## 2017-10-12 ENCOUNTER — TELEPHONE (OUTPATIENT)
Dept: ENDOCRINOLOGY | Age: 72
End: 2017-10-12

## 2017-10-12 LAB
T4 FREE SERPL-MCNC: 1.03 NG/DL (ref 0.82–1.77)
TSH SERPL DL<=0.005 MIU/L-ACNC: 9.48 UIU/ML (ref 0.45–4.5)

## 2017-10-12 NOTE — TELEPHONE ENCOUNTER
Informed pt of results below. Asked pt to stop methimazole. Pt verbalized understanding.      ----- Message from Stefanie Drew MD sent at 10/12/2017  4:11 PM EDT -----  Ask her not to take methimazole - her thyroid is slow now , could be due to methimazole or iodine therapy     Will wait for methimazole to clear out of her now.  No medications

## 2017-10-12 NOTE — TELEPHONE ENCOUNTER
Ask her not to take methimazole - her thyroid is slow now , could be due to methimazole or iodine therapy     Will wait for methimazole to clear out of her now.  No medications

## 2017-10-13 NOTE — TELEPHONE ENCOUNTER
Informed pt she has hypothyroid and according to her test in the past and medication will make it worse.

## 2017-11-02 LAB
T4 FREE SERPL-MCNC: 1.1 NG/DL (ref 0.82–1.77)
TSH SERPL DL<=0.005 MIU/L-ACNC: 5.99 UIU/ML (ref 0.45–4.5)

## 2017-11-07 ENCOUNTER — OFFICE VISIT (OUTPATIENT)
Dept: ENDOCRINOLOGY | Age: 72
End: 2017-11-07

## 2017-11-07 VITALS
BODY MASS INDEX: 39.29 KG/M2 | WEIGHT: 213.5 LBS | SYSTOLIC BLOOD PRESSURE: 128 MMHG | HEIGHT: 62 IN | OXYGEN SATURATION: 94 % | RESPIRATION RATE: 14 BRPM | TEMPERATURE: 97.5 F | DIASTOLIC BLOOD PRESSURE: 73 MMHG | HEART RATE: 64 BPM

## 2017-11-07 DIAGNOSIS — E05.20 TOXIC MULTINODULAR GOITER: ICD-10-CM

## 2017-11-07 DIAGNOSIS — I10 ESSENTIAL HYPERTENSION: ICD-10-CM

## 2017-11-07 DIAGNOSIS — E05.90 HYPERTHYROIDISM: Primary | ICD-10-CM

## 2017-11-07 DIAGNOSIS — E05.90 HYPERTHYROIDISM: ICD-10-CM

## 2017-11-07 RX ORDER — TRIAMCINOLONE ACETONIDE 1 MG/G
OINTMENT TOPICAL
Qty: 30 G | Refills: 5 | OUTPATIENT
Start: 2017-11-07

## 2017-11-07 NOTE — PROGRESS NOTES
Rogelio Adrian is a 81 y.o. female here for   Chief Complaint   Patient presents with    Thyroid Problem       1. Have you been to the ER, urgent care clinic since your last visit? Hospitalized since your last visit? -no    2. Have you seen or consulted any other health care providers outside of the [address] [place] [address] since your last visit? Include any pap smears or colon screening. -PCP    Wt Readings from Last 3 Encounters:   07/11/17 200 lb 9.6 oz (91 kg)   03/07/17 201 lb 4.8 oz (91.3 kg)   09/14/16 196 lb (88.9 kg)     Temp Readings from Last 3 Encounters:   03/07/17 97.5 °F (36.4 °C) (Oral)   09/14/16 96.8 °F (36 °C)   06/09/16 97.3 °F (36.3 °C)     BP Readings from Last 3 Encounters:   07/11/17 129/69   03/07/17 133/75   09/14/16 143/84     Pulse Readings from Last 3 Encounters:   07/11/17 80   03/07/17 61   09/14/16 65

## 2017-11-07 NOTE — PROGRESS NOTES
[facility]               Yaritza Durham MD        [address] 78 [phone] Fax [phone]           Patient Information  Date:11/8/2017  Name : Catherine Jain 81 y.o.     YOB: 1945         Referred by: Lona Kohli NP         Chief Complaint   Patient presents with    Thyroid Problem       History of present illness    Catherine Jain is a 81 y.o. female  here for fu  of thyroid. She was referred by Dr. Chichi Lopes for evaluation and management of hyperthyroidism and nodular goiter. She was found to have hyperthyroidism, ultrasound showed multinodular goiter, left dominant nodule  status post fine needle aspiration biopsy, which was benign by cytopathology. S/p CHAMEBRS therapy   Was on MMI as bridge therapy - she was told to be stop, did not feel good and took Methimazole on her own   Off for atleast 3 weeks     PCP started on Levothyroxine based on labs which she has not taken       No history of atrial fibrillation, known osteoporosis. No FH of thyroid cancer     Past Medical History:   Diagnosis Date    Arthritis     Hypertension     Hyperthyroidism        Current Outpatient Prescriptions   Medication Sig    pravastatin (PRAVACHOL) 20 mg tablet Take 20 mg by mouth nightly.  sertraline (ZOLOFT) 50 mg tablet Take  by mouth daily.  losartan-hydroCHLOROthiazide (HYZAAR) 100-12.5 mg per tablet Take 1 Tab by mouth daily.  albuterol (VENTOLIN HFA) 90 mcg/actuation inhaler Take 2 Puffs by inhalation every six (6) hours as needed for Wheezing.  fluticasone (FLONASE) 50 mcg/actuation nasal spray 2 Sprays by Both Nostrils route once.  potassium chloride (K-DUR, KLOR-CON) 20 mEq tablet Take 20 mEq by mouth daily.  ALPRAZolam (XANAX) 0.5 mg tablet Take 1 Tab by mouth two (2) times a day. Max Daily Amount: 1 mg.  (Patient taking differently: Take 0.5 mg by mouth daily.)    cetirizine (ZYRTEC) 10 mg tablet Take 10 mg by mouth daily.    amLODIPine (NORVASC) 5 mg tablet Take 5 mg by mouth daily.  propranolol LA (INDERAL LA) 120 mg SR capsule Take 120 mg by mouth daily.  beclomethasone (QVAR) 80 mcg/actuation inhaler Take 1 Puff by inhalation two (2) times a day.  triamcinolone acetonide (KENALOG) 0.1 % ointment Apply  to affected area daily as needed. use thin layer      No current facility-administered medications for this visit. Review of Systems:  - Constitutional Symptoms: no fevers, chills  - Eyes: no blurry vision or double vision  -   - Integumentary: no rashes  - Neurological: no numbness, tingling, or headaches  - Psychiatric: no depression or anxiety  - Endocrine:  no polyuria or polydipsia    Physical Examination:  - Blood pressure 128/73, pulse 64, temperature 97.5 °F (36.4 °C), temperature source Oral, resp. rate 14, height 5' 2\" (1.575 m), weight 213 lb 8 oz (96.8 kg), SpO2 94 %. Body mass index is 39.05 kg/(m^2). - General: pleasant, no distress, good eye contact  - HEENT: no exopthalmos, no periorbital edema, no scleral/conjunctival injection, EOMI, no lid lag or stare  - Neck: supple, + thyromegaly,left  nodule,   - Cardiovascular: regular,  normal S1 and S2, no murmurs  - Respiratory: clear to auscultation bilaterally  - Gastrointestinal: soft, nontender, nondistended, BS +  - Musculoskeletal:  no tremors, no edema  - Neurological: alert and oriented   - Psychiatric: normal mood and affect  - Skin - normal turgor    Data Reviewed:       [] Reviewed labs    Assessment/Plan:     1. Hyperthyroidism    2. Toxic multinodular goiter        Toxic MNG     Left dominant nodule FNA benign by cytopathology , was done at [facility]-  S/p  CHAMBERS 6/17  Left nodule has increased to 3 cm from 2.3 cm     US 3/17  There are 3 dominant nodules - isthmus and 2 left nodules     S/p CHAMBERS 6/17 - no need for levothyroxine now  , will monitor TSH , FT4 and will decide the need for therapy.  She just came off Methimazole  Lab Results Component Value Date/Time    TSH 5.990 11/01/2017 04:26 PM         HTN - stable     Follow-up Disposition:  Return in about 3 months (around 2/7/2018). Thank you for allowing me to participate in the care of this patient.     Kanika Garcia MD      Patient verbalized understanding

## 2017-11-07 NOTE — MR AVS SNAPSHOT
Visit Information Date & Time Provider Department Dept. Phone Encounter #  
 11/7/2017  2:30 PM Julio Mcnally MD [facility] Diabetes & Endocrinology [phone] [id number] Follow-up Instructions Return in about 3 months (around 2/7/2018). Upcoming Health Maintenance Date Due Hepatitis C Screening 1945 DTaP/Tdap/Td series (1 - Tdap) 3/5/1968 BREAST CANCER SCRN MAMMOGRAM 3/5/1997 FOBT Q 1 YEAR AGE 50-75 3/5/1997 ZOSTER VACCINE AGE 60> 1/5/2007 GLAUCOMA SCREENING Q2Y 3/5/2012 OSTEOPOROSIS SCREENING (DEXA) 3/5/2012 Pneumococcal 65+ Low/Medium Risk (1 of 2 - PCV13) 3/5/2012 MEDICARE YEARLY EXAM 3/5/2012 Influenza Age 5 to Adult 8/1/2017 Allergies as of 11/7/2017  Review Complete On: 11/7/2017 By: Julio Mcnally MD  
  
 Severity Noted Reaction Type Reactions Ciprofloxacin  07/11/2017    Diarrhea Novocain [Procaine]  07/11/2017    Itching Current Immunizations  Never Reviewed No immunizations on file. Not reviewed this visit You Were Diagnosed With   
  
 Codes Comments Thyroiditis    -  Primary ICD-10-CM: E06.9 ICD-9-CM: 245.9 Hyperthyroidism     ICD-10-CM: E05.90 ICD-9-CM: 242.90 Toxic multinodular goiter     ICD-10-CM: E05.20 ICD-9-CM: 242.20 Essential hypertension     ICD-10-CM: I10 
ICD-9-CM: 401.9 Vitals BP Pulse Temp Resp Height(growth percentile) Weight(growth percentile) 128/73 (BP 1 Location: Left arm, BP Patient Position: Sitting) 64 97.5 °F (36.4 °C) (Oral) 14 5' 2\" (1.575 m) 213 lb 8 oz (96.8 kg) SpO2 BMI OB Status Smoking Status 94% 39.05 kg/m2 Menopause Former Smoker Vitals History BMI and BSA Data Body Mass Index Body Surface Area 39.05 kg/m 2 2.06 m 2 Preferred Pharmacy Pharmacy Name Phone [facility], [address] [place] [address] [phone] Your Updated Medication List  
  
   
This list is accurate as of: 11/7/17  3:35 PM.  Always use your most recent med list.  
  
  
  
  
 albuterol 90 mcg/actuation inhaler Commonly known as:  VENTOLIN HFA Take 2 Puffs by inhalation every six (6) hours as needed for Wheezing. ALPRAZolam 0.5 mg tablet Commonly known as:  Yancy Joyce Take 1 Tab by mouth two (2) times a day. Max Daily Amount: 1 mg. amLODIPine 5 mg tablet Commonly known as:  Arlyss Trip Take 5 mg by mouth daily. beclomethasone 80 mcg/actuation [employer] Commonly known as:  QVAR Take 1 Puff by inhalation two (2) times a day. cetirizine 10 mg tablet Commonly known as:  ZYRTEC Take 10 mg by mouth daily. fluticasone 50 mcg/actuation nasal spray Commonly known as:  Osmin Riser 2 Sprays by Both Nostrils route once. losartan-hydroCHLOROthiazide 100-12.5 mg per tablet Commonly known as:  HYZAAR Take 1 Tab by mouth daily. potassium chloride 20 mEq tablet Commonly known as:  K-DUR, KLOR-CON Take 20 mEq by mouth daily. pravastatin 20 mg tablet Commonly known as:  PRAVACHOL Take 20 mg by mouth nightly. propranolol  mg SR capsule Commonly known as:  INDERAL LA Take 120 mg by mouth daily. sertraline 50 mg tablet Commonly known as:  ZOLOFT Take  by mouth daily. triamcinolone acetonide 0.1 % ointment Commonly known as:  KENALOG Apply  to affected area daily as needed. use thin layer Follow-up Instructions Return in about 3 months (around 2/7/2018). Please provide this summary of care documentation to your next provider. Your primary care clinician is listed as Yessica Alexander. If you have any questions after today's visit, please call [phone].

## 2017-11-08 PROBLEM — E06.9 THYROIDITIS: Status: RESOLVED | Noted: 2017-07-15 | Resolved: 2017-11-08

## 2017-12-27 ENCOUNTER — TELEPHONE (OUTPATIENT)
Dept: ENDOCRINOLOGY | Age: 72
End: 2017-12-27

## 2017-12-27 NOTE — TELEPHONE ENCOUNTER
Pt wanted to know if Dr. James Renteria received the results her PCP did. Informed pt that I went on [facility] and got the results, but Dr. James Renteria is out of the office until next week so she will review them when she gets back. Pt verbalized understanding and asked when her f/u appt was. Informed her I do not see a f/u scheduled but she is supped to return in Feb 2018. Transferred pt to  to make f/u appt.      Results placed in review folder  TSH          1.990  T4, Free    1.03

## 2017-12-29 NOTE — TELEPHONE ENCOUNTER
Attempted to call. Unsuccessful. Left msg for Lauren Villalobos to give us a call back at the office. A callback number was left.

## 2017-12-29 NOTE — TELEPHONE ENCOUNTER
Tests are perfect     Continue the medications she is currently on    I had asked her to stop levothyroxine started by PCP - hope she is off

## 2018-01-02 NOTE — TELEPHONE ENCOUNTER
Informed pt of Dr. Therese Dougherty note. Pt verbalized understanding and stated she did stop the Levothyroxine. No further questions or concerns at this time.

## 2018-09-19 LAB — CREATININE, EXTERNAL: 0.84

## 2018-10-01 ENCOUNTER — TELEPHONE (OUTPATIENT)
Dept: ENDOCRINOLOGY | Age: 73
End: 2018-10-01

## 2018-10-01 NOTE — TELEPHONE ENCOUNTER
----- Message from Richard Perez sent at 10/1/2018  1:08 PM EDT -----  Regarding: /[facility] office, requesting to speak with the nurse in regards to changing providers. A call was made on Wednesday 09/26/18 regarding this matter with no response from the office. Pt being seen regarding \"Toxic thyroid nodules. \"  Best contact number [phone]

## 2018-10-05 ENCOUNTER — OFFICE VISIT (OUTPATIENT)
Dept: ENDOCRINOLOGY | Age: 73
End: 2018-10-05

## 2018-10-05 VITALS
BODY MASS INDEX: 41.04 KG/M2 | WEIGHT: 223 LBS | DIASTOLIC BLOOD PRESSURE: 64 MMHG | OXYGEN SATURATION: 95 % | HEART RATE: 66 BPM | TEMPERATURE: 97.6 F | RESPIRATION RATE: 16 BRPM | HEIGHT: 62 IN | SYSTOLIC BLOOD PRESSURE: 114 MMHG

## 2018-10-05 DIAGNOSIS — E03.9 ACQUIRED HYPOTHYROIDISM: ICD-10-CM

## 2018-10-05 DIAGNOSIS — I10 ESSENTIAL HYPERTENSION: ICD-10-CM

## 2018-10-05 DIAGNOSIS — E04.2 MULTINODULAR GOITER (NONTOXIC): Primary | ICD-10-CM

## 2018-10-05 PROBLEM — E66.01 OBESITY, MORBID (HCC): Status: ACTIVE | Noted: 2018-10-05

## 2018-10-05 RX ORDER — MONTELUKAST SODIUM 10 MG/1
TABLET ORAL
Refills: 3 | COMMUNITY
Start: 2018-10-04

## 2018-10-05 RX ORDER — FUROSEMIDE 20 MG/1
20 TABLET ORAL DAILY
COMMUNITY
Start: 2018-09-13

## 2018-10-05 RX ORDER — LEVOTHYROXINE SODIUM 25 UG/1
25 TABLET ORAL
COMMUNITY
Start: 2018-09-21

## 2018-10-05 NOTE — PROGRESS NOTES
Javy Miranda is a 72 y.o. female here for   Chief Complaint   Patient presents with    Thyroid Problem       1. Have you been to the ER, urgent care clinic since your last visit? Hospitalized since your last visit? -no    2. Have you seen or consulted any other health care providers outside of the [address] [place] [address] since your last visit? Include any pap smears or colon screening. -PCP

## 2018-10-05 NOTE — PROGRESS NOTES
[facility]               Adwoa Cárdenas MD        [address] [address] HK:[phone] Fax [phone]           Patient Information  Date:10/7/2018  Name : Louise Beal 72 y.o.     YOB: 1945         Referred by: Manuela Sams NP         Chief Complaint   Patient presents with    Thyroid Problem       History of present illness    Louise Beal is a 72 y.o. female  here for fu  of thyroid. She was referred by Dr. Reji Rich for evaluation and management of hyperthyroidism and nodular goiter. She was found to have hyperthyroidism, ultrasound showed multinodular goiter, left dominant nodule  status post fine needle aspiration biopsy, which was benign by cytopathology. S/p CHAMBERS therapy June 2017  She was told to have hypothyroidism and is on levothyroxine 25 mcg. I do not have any labs  He has not noticed any difference after starting the medication  No dysphagia or dyspnea      PCP started on Levothyroxine based on labs which she has not taken       No history of atrial fibrillation, known osteoporosis. No FH of thyroid cancer     Past Medical History:   Diagnosis Date    Arthritis     Hypertension     Hyperthyroidism        Current Outpatient Prescriptions   Medication Sig    levothyroxine (SYNTHROID) 25 mcg tablet Take 25 mcg by mouth Daily (before breakfast).  furosemide (LASIX) 20 mg tablet Take 20 mg by mouth daily.  montelukast (SINGULAIR) 10 mg tablet TK 1 T PO ONCE D    pravastatin (PRAVACHOL) 20 mg tablet Take 20 mg by mouth nightly.  sertraline (ZOLOFT) 50 mg tablet Take  by mouth daily.  losartan-hydroCHLOROthiazide (HYZAAR) 100-12.5 mg per tablet Take 1 Tab by mouth daily.  albuterol (VENTOLIN HFA) 90 mcg/actuation inhaler Take 2 Puffs by inhalation every six (6) hours as needed for Wheezing.  fluticasone (FLONASE) 50 mcg/actuation nasal spray 2 Sprays by Both Nostrils route as needed.     triamcinolone acetonide (KENALOG) 0.1 % ointment Apply  to affected area daily as needed. use thin layer     cetirizine (ZYRTEC) 10 mg tablet Take 10 mg by mouth daily.  amLODIPine (NORVASC) 5 mg tablet Take 5 mg by mouth daily.  propranolol LA (INDERAL LA) 120 mg SR capsule Take 120 mg by mouth daily.  potassium chloride (K-DUR, KLOR-CON) 20 mEq tablet Take 20 mEq by mouth daily.  beclomethasone (QVAR) 80 mcg/actuation inhaler Take 1 Puff by inhalation two (2) times a day.  ALPRAZolam (XANAX) 0.5 mg tablet Take 1 Tab by mouth two (2) times a day. Max Daily Amount: 1 mg. (Patient taking differently: Take 0.5 mg by mouth daily.)     No current facility-administered medications for this visit. Review of Systems:  - Constitutional Symptoms: no fevers, chills  - Eyes: no blurry vision or double vision  -   - Integumentary: no rashes  - Neurological: no numbness, tingling, or headaches  - Psychiatric: no depression or anxiety  - Endocrine:  no polyuria or polydipsia    Physical Examination:  - Blood pressure 114/64, pulse 66, temperature 97.6 °F (36.4 °C), temperature source Oral, resp. rate 16, height 5' 2\" (1.575 m), weight 223 lb (101.2 kg), SpO2 95 %. Body mass index is 40.79 kg/(m^2). - General: pleasant, no distress, good eye contact  - HEENT: no exopthalmos, no periorbital edema, no scleral/conjunctival injection, EOMI, no lid lag or stare  - Neck: supple,  - Cardiovascular: regular,  normal S1 and S2, no murmurs  - Respiratory: clear to auscultation bilaterally  - Gastrointestinal: soft, nontender, nondistended, BS +  - Musculoskeletal:  no tremors, no edema  - Neurological: alert and oriented   - Psychiatric: normal mood and affect  - Skin - normal turgor    Data Reviewed:       [] Reviewed labs    Assessment/Plan:     1. Multinodular goiter (nontoxic)    2. Essential hypertension    3.  Acquired hypothyroidism        Toxic MNG     Left dominant nodule FNA benign by cytopathology , was done at [address]-  S/p  CHAMBERS 6/17  Left nodule had increased to 3 cm from 2.3 cm  Has not had ultrasound after radioactive iodine therapy  Needs thyroid ultrasound     US 3/17  There are 3 dominant nodules - isthmus and 2 left nodules     S/p CHAMBERS 6/17 -   Lab Results   Component Value Date/Time    TSH 5.990 (H) 11/01/2017 04:26 PM         Asked her to get a copy of the labs from PCP    Follow-up Disposition: Not on File    Thank you for allowing me to participate in the care of this patient.     Sage Haji MD      Patient verbalized understanding

## 2018-10-05 NOTE — MR AVS SNAPSHOT
[address] [facility] [address] 
[phone] Patient: Mihir Romero MRN: [id number] DMD:[birth year] Visit Information Date & Time Provider Department Dept. Phone Encounter #  
 10/5/2018  3:30 PM Cj Isabel MD [facility] Diabetes & Endocrinology [phone] [id number] Upcoming Health Maintenance Date Due Hepatitis C Screening 1945 DTaP/Tdap/Td series (1 - Tdap) 3/5/1968 Shingrix Vaccine Age 50> (1 of 2) 3/5/1997 BREAST CANCER SCRN MAMMOGRAM 3/5/1997 FOBT Q 1 YEAR AGE 50-75 3/5/1997 GLAUCOMA SCREENING Q2Y 3/5/2012 Bone Densitometry (Dexa) Screening 3/5/2012 Pneumococcal 65+ Low/Medium Risk (1 of 2 - PCV13) 3/5/2012 MEDICARE YEARLY EXAM 3/14/2018 Influenza Age 5 to Adult 8/1/2018 Allergies as of 10/5/2018  Review Complete On: 10/5/2018 By: Cj Isabel MD  
  
 Severity Noted Reaction Type Reactions Ciprofloxacin  07/11/2017    Diarrhea Novocain [Procaine]  07/11/2017    Itching Current Immunizations  Never Reviewed No immunizations on file. Not reviewed this visit You Were Diagnosed With   
  
 Codes Comments Hyperthyroidism    -  Primary ICD-10-CM: E05.90 ICD-9-CM: 242.90 Toxic multinodular goiter     ICD-10-CM: E05.20 ICD-9-CM: 242.20 Essential hypertension     ICD-10-CM: I10 
ICD-9-CM: 401.9 Vitals BP Pulse Temp Resp Height(growth percentile) Weight(growth percentile) 114/64 (BP 1 Location: Right arm, BP Patient Position: Sitting) 66 97.6 °F (36.4 °C) (Oral) 16 5' 2\" (1.575 m) 223 lb (101.2 kg) SpO2 BMI OB Status Smoking Status 95% 40.79 kg/m2 Menopause Former Smoker Vitals History BMI and BSA Data Body Mass Index Body Surface Area 40.79 kg/m 2 2.1 m 2 Preferred Pharmacy Pharmacy Name Phone  [address] AT [facility] [place] [address] [place] [address] [phone] Your Updated Medication List  
  
   
This list is accurate as of 10/5/18  4:29 PM.  Always use your most recent med list.  
  
  
  
  
 albuterol 90 mcg/actuation inhaler Commonly known as:  VENTOLIN HFA Take 2 Puffs by inhalation every six (6) hours as needed for Wheezing. ALPRAZolam 0.5 mg tablet Commonly known as:  Mardel Romeo Take 1 Tab by mouth two (2) times a day. Max Daily Amount: 1 mg. amLODIPine 5 mg tablet Commonly known as:  Nguyễn Donna Take 5 mg by mouth daily. beclomethasone 80 mcg/actuation [employer] Commonly known as:  QVAR Take 1 Puff by inhalation two (2) times a day. cetirizine 10 mg tablet Commonly known as:  ZYRTEC Take 10 mg by mouth daily. fluticasone 50 mcg/actuation nasal spray Commonly known as:  Qu[place] Forts 2 Sprays by Both Nostrils route as needed. furosemide 20 mg tablet Commonly known as:  LASIX Take 20 mg by mouth daily. levothyroxine 25 mcg tablet Commonly known as:  SYNTHROID Take 25 mcg by mouth Daily (before breakfast). losartan-hydroCHLOROthiazide 100-12.5 mg per tablet Commonly known as:  HYZAAR Take 1 Tab by mouth daily. montelukast 10 mg tablet Commonly known as:  SINGULAIR TK 1 T PO ONCE D  
  
 potassium chloride 20 mEq tablet Commonly known as:  K-DUR, KLOR-CON Take 20 mEq by mouth daily. pravastatin 20 mg tablet Commonly known as:  PRAVACHOL Take 20 mg by mouth nightly. propranolol  mg SR capsule Commonly known as:  INDERAL LA Take 120 mg by mouth daily. sertraline 50 mg tablet Commonly known as:  ZOLOFT Take  by mouth daily. triamcinolone acetonide 0.1 % ointment Commonly known as:  KENALOG Apply  to affected area daily as needed. use thin layer Introducing [facility]!    
 Hardeep Bravo introduces [employer] patient portal. Now you can access parts of your medical record, email your doctor's office, and request medication refills online. 1. In your internet browser, go to [email or web address] 2. Click on the First Time User? Click Here link in the Sign In box. You will see the New Member Sign Up page. 3. Enter your [employer] Access Code exactly as it appears below. You will not need to use this code after youve completed the sign-up process. If you do not sign up before the expiration date, you must request a new code. · [employer] Access Code: [email or web address] Expires: 1/3/2019  3:37 PM 
 
4. Enter the last four digits of your Social Security Number (xxxx) and Date of Birth (mm/dd/yyyy) as indicated and click Submit. You will be taken to the next sign-up page. 5. Create a [employer]camp ID. This will be your [employer] login ID and cannot be changed, so think of one that is secure and easy to remember. 6. Create a [employer] password. You can change your password at any time. 7. Enter your Password Reset Question and Answer. This can be used at a later time if you forget your password. 8. Enter your e-mail address. You will receive e-mail notification when new information is available in [address] 9. Click Sign Up. You can now view and download portions of your medical record. 10. Click the Download Summary menu link to download a portable copy of your medical information. If you have questions, please visit the Frequently Asked Questions section of the [employer] website. Remember, [employer] is NOT to be used for urgent needs. For medical emergencies, dial 911. Now available from your iPhone and Android! Please provide this summary of care documentation to your next provider. Your primary care clinician is listed as Alec Chavez. If you have any questions after today's visit, please call [phone].

## 2018-10-07 PROBLEM — E04.2 MULTINODULAR GOITER (NONTOXIC): Status: ACTIVE | Noted: 2018-10-07

## 2018-10-07 PROBLEM — E03.9 ACQUIRED HYPOTHYROIDISM: Status: ACTIVE | Noted: 2018-10-07

## 2018-11-05 ENCOUNTER — TELEPHONE (OUTPATIENT)
Dept: ENDOCRINOLOGY | Age: 73
End: 2018-11-05

## 2018-11-05 NOTE — LETTER
11/9/2018 3:42 PM 
 
Ms. Lauren Elliott 82 [phone] Dear Ms Herrera Kym been trying to reach you, but have been unsuccessful. Dr. Eran Arnold wanted me to inform you of the following \"Continue the same dose. The levels were minimally off to begin with and since you were already started on thyroid hormone we will continue and if no change in the levels we will decide about taking you off. \" She would like for you to follow up in 6 months and have lab work done about a week prior to that. Enclosed in this letter is a lab slip. Please call our office at [phone] to schedule the follow-up. Thank you.  
  
 
 
 
Sincerely, MARY JANE Nazario MD

## 2018-11-05 NOTE — TELEPHONE ENCOUNTER
Per Dr. Soni Marshall, informed pt that labs from October were normal. Pt verbalized understanding and wanted to know if she should continue thyroid med. Informed her that Dr. Soni Marshall did not say to d/c med and to continue taking it for now. I will verify with Dr. Soni Marshall. No further questions or concerns at this time.

## 2018-11-06 NOTE — TELEPHONE ENCOUNTER
Continue the same dose    The levels were minimally off to begin with and since she was already started on thyroid hormone we will continue and if no change in the levels we will decide about taking it off    FU in 6 M     TSH [facility]

## 2018-12-05 ENCOUNTER — TELEPHONE (OUTPATIENT)
Dept: ENDOCRINOLOGY | Age: 73
End: 2018-12-05

## 2018-12-05 NOTE — TELEPHONE ENCOUNTER
Informed Nichole Delatorre, Dr Héctor Aguilera only want the ultrasound. Brett verbalized understanding.

## 2018-12-14 ENCOUNTER — OP HISTORICAL/CONVERTED ENCOUNTER (OUTPATIENT)
Dept: OTHER | Age: 73
End: 2018-12-14

## 2019-01-24 ENCOUNTER — TELEPHONE (OUTPATIENT)
Dept: ENDOCRINOLOGY | Age: 74
End: 2019-01-24

## 2019-01-24 NOTE — TELEPHONE ENCOUNTER
Informed pt that there is no change in the thyroid nodules and she will need to follow up in one year. Pt verbalized understanding.

## 2021-01-26 ENCOUNTER — TRANSCRIBE ORDER (OUTPATIENT)
Dept: SCHEDULING | Age: 76
End: 2021-01-26

## 2021-01-26 DIAGNOSIS — Z12.31 SCREENING MAMMOGRAM FOR HIGH-RISK PATIENT: Primary | ICD-10-CM

## 2021-03-22 ENCOUNTER — TRANSCRIBE ORDER (OUTPATIENT)
Dept: SCHEDULING | Age: 76
End: 2021-03-22

## 2021-03-22 DIAGNOSIS — N60.02 SINGLE CYST OF LEFT BREAST: Primary | ICD-10-CM

## 2022-03-18 PROBLEM — I10 ESSENTIAL HYPERTENSION: Status: ACTIVE | Noted: 2017-03-11

## 2022-03-18 PROBLEM — E03.9 ACQUIRED HYPOTHYROIDISM: Status: ACTIVE | Noted: 2018-10-07

## 2022-03-19 PROBLEM — E66.01 OBESITY, MORBID (HCC): Status: ACTIVE | Noted: 2018-10-05

## 2022-03-19 PROBLEM — E04.2 MULTINODULAR GOITER (NONTOXIC): Status: ACTIVE | Noted: 2018-10-07

## 2023-05-25 RX ORDER — CETIRIZINE HYDROCHLORIDE 10 MG/1
10 TABLET ORAL DAILY
COMMUNITY

## 2023-05-25 RX ORDER — FLUTICASONE PROPIONATE 50 MCG
2 SPRAY, SUSPENSION (ML) NASAL PRN
COMMUNITY

## 2023-05-25 RX ORDER — PRAVASTATIN SODIUM 20 MG
20 TABLET ORAL NIGHTLY
COMMUNITY

## 2023-05-25 RX ORDER — POTASSIUM CHLORIDE 20 MEQ/1
20 TABLET, EXTENDED RELEASE ORAL DAILY
COMMUNITY

## 2023-05-25 RX ORDER — ALPRAZOLAM 0.5 MG/1
0.5 TABLET ORAL 2 TIMES DAILY
COMMUNITY
Start: 2016-05-31

## 2023-05-25 RX ORDER — ALBUTEROL SULFATE 90 UG/1
2 AEROSOL, METERED RESPIRATORY (INHALATION) EVERY 6 HOURS PRN
COMMUNITY
Start: 2017-07-11

## 2023-05-25 RX ORDER — LEVOTHYROXINE SODIUM 0.03 MG/1
25 TABLET ORAL
COMMUNITY
Start: 2018-09-21

## 2023-05-25 RX ORDER — AMLODIPINE BESYLATE 5 MG/1
5 TABLET ORAL DAILY
COMMUNITY

## 2023-05-25 RX ORDER — MONTELUKAST SODIUM 10 MG/1
TABLET ORAL
COMMUNITY
Start: 2018-10-04

## 2023-05-25 RX ORDER — FUROSEMIDE 20 MG/1
20 TABLET ORAL DAILY
COMMUNITY
Start: 2018-09-13

## 2023-05-25 RX ORDER — LOSARTAN POTASSIUM AND HYDROCHLOROTHIAZIDE 12.5; 1 MG/1; MG/1
1 TABLET ORAL DAILY
COMMUNITY

## 2023-08-16 ENCOUNTER — TRANSCRIBE ORDERS (OUTPATIENT)
Facility: HOSPITAL | Age: 78
End: 2023-08-16

## 2023-08-16 DIAGNOSIS — R91.8 OTHER NONSPECIFIC ABNORMAL FINDING OF LUNG FIELD: Primary | ICD-10-CM

## 2023-09-12 ENCOUNTER — ANESTHESIA EVENT (OUTPATIENT)
Facility: HOSPITAL | Age: 78
End: 2023-09-12
Payer: MEDICARE

## 2023-09-12 ENCOUNTER — HOSPITAL ENCOUNTER (OUTPATIENT)
Facility: HOSPITAL | Age: 78
Setting detail: OUTPATIENT SURGERY
Discharge: HOME | End: 2023-09-12
Attending: INTERNAL MEDICINE | Admitting: INTERNAL MEDICINE
Payer: MEDICARE

## 2023-09-12 ENCOUNTER — ANESTHESIA (OUTPATIENT)
Facility: HOSPITAL | Age: 78
End: 2023-09-12
Payer: MEDICARE

## 2023-09-12 VITALS
TEMPERATURE: 97.4 F | SYSTOLIC BLOOD PRESSURE: 174 MMHG | OXYGEN SATURATION: 98 % | BODY MASS INDEX: 41.77 KG/M2 | RESPIRATION RATE: 18 BRPM | WEIGHT: 227 LBS | HEIGHT: 62 IN | HEART RATE: 60 BPM | DIASTOLIC BLOOD PRESSURE: 86 MMHG

## 2023-09-12 DIAGNOSIS — R93.3 ABNORMAL FINDINGS ON DIAGNOSTIC IMAGING OF DIGESTIVE SYSTEM: ICD-10-CM

## 2023-09-12 PROCEDURE — 6360000002 HC RX W HCPCS: Performed by: NURSE ANESTHETIST, CERTIFIED REGISTERED

## 2023-09-12 PROCEDURE — 7100000011 HC PHASE II RECOVERY - ADDTL 15 MIN: Performed by: INTERNAL MEDICINE

## 2023-09-12 PROCEDURE — 7100000010 HC PHASE II RECOVERY - FIRST 15 MIN: Performed by: INTERNAL MEDICINE

## 2023-09-12 PROCEDURE — 2709999900 HC NON-CHARGEABLE SUPPLY: Performed by: INTERNAL MEDICINE

## 2023-09-12 PROCEDURE — 88305 TISSUE EXAM BY PATHOLOGIST: CPT

## 2023-09-12 PROCEDURE — 2580000003 HC RX 258: Performed by: INTERNAL MEDICINE

## 2023-09-12 PROCEDURE — 2580000003 HC RX 258: Performed by: NURSE ANESTHETIST, CERTIFIED REGISTERED

## 2023-09-12 PROCEDURE — 3600007502: Performed by: INTERNAL MEDICINE

## 2023-09-12 PROCEDURE — 3700000000 HC ANESTHESIA ATTENDED CARE: Performed by: INTERNAL MEDICINE

## 2023-09-12 PROCEDURE — 3600007512: Performed by: INTERNAL MEDICINE

## 2023-09-12 PROCEDURE — 3700000001 HC ADD 15 MINUTES (ANESTHESIA): Performed by: INTERNAL MEDICINE

## 2023-09-12 RX ORDER — SODIUM CHLORIDE 9 MG/ML
INJECTION, SOLUTION INTRAVENOUS CONTINUOUS
Status: DISCONTINUED | OUTPATIENT
Start: 2023-09-12 | End: 2023-09-12 | Stop reason: HOSPADM

## 2023-09-12 RX ORDER — SODIUM CHLORIDE, SODIUM LACTATE, POTASSIUM CHLORIDE, CALCIUM CHLORIDE 600; 310; 30; 20 MG/100ML; MG/100ML; MG/100ML; MG/100ML
INJECTION, SOLUTION INTRAVENOUS CONTINUOUS
Status: DISCONTINUED | OUTPATIENT
Start: 2023-09-12 | End: 2023-09-12 | Stop reason: HOSPADM

## 2023-09-12 RX ORDER — SODIUM CHLORIDE, SODIUM LACTATE, POTASSIUM CHLORIDE, CALCIUM CHLORIDE 600; 310; 30; 20 MG/100ML; MG/100ML; MG/100ML; MG/100ML
INJECTION, SOLUTION INTRAVENOUS CONTINUOUS PRN
Status: DISCONTINUED | OUTPATIENT
Start: 2023-09-12 | End: 2023-09-12 | Stop reason: SDUPTHER

## 2023-09-12 RX ADMIN — PROPOFOL 50 MG: 10 INJECTION, EMULSION INTRAVENOUS at 13:44

## 2023-09-12 RX ADMIN — PROPOFOL 50 MG: 10 INJECTION, EMULSION INTRAVENOUS at 13:46

## 2023-09-12 RX ADMIN — PROPOFOL 50 MG: 10 INJECTION, EMULSION INTRAVENOUS at 13:50

## 2023-09-12 RX ADMIN — PROPOFOL 25 MG: 10 INJECTION, EMULSION INTRAVENOUS at 14:04

## 2023-09-12 RX ADMIN — PROPOFOL 30 MG: 10 INJECTION, EMULSION INTRAVENOUS at 13:59

## 2023-09-12 RX ADMIN — PROPOFOL 20 MG: 10 INJECTION, EMULSION INTRAVENOUS at 13:55

## 2023-09-12 RX ADMIN — SODIUM CHLORIDE, POTASSIUM CHLORIDE, SODIUM LACTATE AND CALCIUM CHLORIDE: 600; 310; 30; 20 INJECTION, SOLUTION INTRAVENOUS at 13:35

## 2023-09-12 RX ADMIN — SODIUM CHLORIDE, POTASSIUM CHLORIDE, SODIUM LACTATE AND CALCIUM CHLORIDE: 600; 310; 30; 20 INJECTION, SOLUTION INTRAVENOUS at 11:03

## 2023-09-12 ASSESSMENT — PAIN - FUNCTIONAL ASSESSMENT: PAIN_FUNCTIONAL_ASSESSMENT: NONE - DENIES PAIN

## 2023-09-12 ASSESSMENT — PAIN SCALES - GENERAL
PAINLEVEL_OUTOF10: 0
PAINLEVEL_OUTOF10: 0

## 2023-09-12 NOTE — ANESTHESIA PRE PROCEDURE
Department of Anesthesiology  Preprocedure Note       Name:  Jennifer Unger   Age:  70 y.o.  :  1945                                          MRN:  [id number]         Date:  2023      Surgeon: Mer Melo):  Nidhi Sethi MD    Procedure: Procedure(s):  COLONOSCOPY    Medications prior to admission:   Prior to Admission medications    Medication Sig Start Date End Date Taking? Authorizing Provider   albuterol sulfate HFA (PROVENTIL;VENTOLIN;PROAIR) 108 (90 Base) MCG/ACT inhaler Inhale 2 puffs into the lungs every 6 hours as needed 17   Ar Automatic Reconciliation   ALPRAZolam (XANAX) 0.5 MG tablet Take 1 tablet by mouth 2 times daily.  Max Daily Amount: 1 mg 16   Ar Automatic Reconciliation   amLODIPine (NORVASC) 5 MG tablet Take 1 tablet by mouth daily    Ar Automatic Reconciliation   cetirizine (ZYRTEC) 10 MG tablet Take 1 tablet by mouth daily    Ar Automatic Reconciliation   fluticasone (FLONASE) 50 MCG/ACT nasal spray 2 sprays by Nasal route as needed    Ar Automatic Reconciliation   furosemide (LASIX) 20 MG tablet Take 1 tablet by mouth daily 18   Ar Automatic Reconciliation   levothyroxine (SYNTHROID) 25 MCG tablet Take 1 tablet by mouth every morning (before breakfast) 18   Ar Automatic Reconciliation   losartan-hydroCHLOROthiazide (HYZAAR) 100-12.5 MG per tablet Take 1 tablet by mouth daily    Ar Automatic Reconciliation   montelukast (SINGULAIR) 10 MG tablet TK 1 T PO ONCE D 10/4/18   Ar Automatic Reconciliation   potassium chloride (KLOR-CON M) 20 MEQ extended release tablet Take 1 tablet by mouth daily    Ar Automatic Reconciliation   pravastatin (PRAVACHOL) 20 MG tablet Take 1 tablet by mouth nightly    Ar Automatic Reconciliation   propranolol (INDERAL XL) 120 MG extended release capsule Take 1 capsule by mouth daily    Ar Automatic Reconciliation   sertraline (ZOLOFT) 50 MG tablet Take by mouth daily    Ar Automatic Reconciliation   triamcinolone (KENALOG) 0.1 % ointment

## 2023-09-12 NOTE — ANESTHESIA POSTPROCEDURE EVALUATION
Department of Anesthesiology  Postprocedure Note    Patient: Page Chamberlain  MRN: [id number]  YOB: 1945  Date of evaluation: 9/12/2023      Procedure Summary     Date: 09/12/23 Room / Location: [facility] ENDO 03 / [facility] ENDOSCOPY    Anesthesia Start: 1335 Anesthesia Stop: 1406    Procedure: COLONOSCOPY (Lower GI Region) Diagnosis:       Abnormal findings on diagnostic imaging of digestive system      (Abnormal findings on diagnostic imaging of digestive system [R93.3])    Surgeons: Giselle Barbosa MD Responsible Provider: Nabor Zurita MD    Anesthesia Type: MAC ASA Status: 3          Anesthesia Type: MAC    Jimi Phase I: Jimi Score: 10    Jimi Phase II:        Anesthesia Post Evaluation    Patient location during evaluation: bedside (Endoscopy Unit)  Patient participation: complete - patient participated  Level of consciousness: sleepy but conscious  Pain score: 0  Airway patency: patent  Nausea & Vomiting: no nausea and no vomiting  Complications: no  Cardiovascular status: hemodynamically stable  Respiratory status: acceptable  Hydration status: stable  Comments: This patient remained on the stretcher. The patient was handed off to the endoscopy nursing team.  All questions regarding pre-, intra-, and postoperative care were answered.   Multimodal analgesia pain management approach

## 2023-11-28 ENCOUNTER — HOSPITAL ENCOUNTER (INPATIENT)
Facility: HOSPITAL | Age: 78
LOS: 2 days | Discharge: HOME | DRG: 202 | End: 2023-11-30
Attending: EMERGENCY MEDICINE | Admitting: INTERNAL MEDICINE
Payer: MEDICARE

## 2023-11-28 ENCOUNTER — APPOINTMENT (OUTPATIENT)
Facility: HOSPITAL | Age: 78
DRG: 202 | End: 2023-11-28
Payer: MEDICARE

## 2023-11-28 DIAGNOSIS — I10 PRIMARY HYPERTENSION: ICD-10-CM

## 2023-11-28 DIAGNOSIS — R06.02 SHORTNESS OF BREATH: Primary | ICD-10-CM

## 2023-11-28 PROBLEM — R06.00 DYSPNEA: Status: ACTIVE | Noted: 2023-11-28

## 2023-11-28 LAB
ANION GAP SERPL CALC-SCNC: 10 MMOL/L (ref 5–15)
BASOPHILS # BLD: 0 K/UL (ref 0–0.1)
BASOPHILS NFR BLD: 0 % (ref 0–1)
BUN SERPL-MCNC: 15 MG/DL (ref 6–20)
BUN/CREAT SERPL: 19 (ref 12–20)
CA-I BLD-MCNC: 9.4 MG/DL (ref 8.5–10.1)
CHLORIDE SERPL-SCNC: 101 MMOL/L (ref 97–108)
CO2 SERPL-SCNC: 26 MMOL/L (ref 21–32)
CREAT SERPL-MCNC: 0.81 MG/DL (ref 0.55–1.02)
DIFFERENTIAL METHOD BLD: ABNORMAL
EOSINOPHIL # BLD: 0 K/UL (ref 0–0.4)
EOSINOPHIL NFR BLD: 0 % (ref 0–7)
ERYTHROCYTE [DISTWIDTH] IN BLOOD BY AUTOMATED COUNT: 13.1 % (ref 11.5–14.5)
GLUCOSE SERPL-MCNC: 161 MG/DL (ref 65–100)
HCT VFR BLD AUTO: 37 % (ref 35–47)
HGB BLD-MCNC: 11.8 G/DL (ref 11.5–16)
IMM GRANULOCYTES # BLD AUTO: 0.1 K/UL (ref 0–0.04)
IMM GRANULOCYTES NFR BLD AUTO: 1 % (ref 0–0.5)
LYMPHOCYTES # BLD: 1.4 K/UL (ref 0.8–3.5)
LYMPHOCYTES NFR BLD: 13 % (ref 12–49)
MCH RBC QN AUTO: 27.3 PG (ref 26–34)
MCHC RBC AUTO-ENTMCNC: 31.9 G/DL (ref 30–36.5)
MCV RBC AUTO: 85.5 FL (ref 80–99)
MONOCYTES # BLD: 0.1 K/UL (ref 0–1)
MONOCYTES NFR BLD: 1 % (ref 5–13)
NEUTS SEG # BLD: 8.9 K/UL (ref 1.8–8)
NEUTS SEG NFR BLD: 85 % (ref 32–75)
NRBC # BLD: 0 K/UL (ref 0–0.01)
NRBC BLD-RTO: 0 PER 100 WBC
PLATELET # BLD AUTO: 433 K/UL (ref 150–400)
PMV BLD AUTO: 10.3 FL (ref 8.9–12.9)
POTASSIUM SERPL-SCNC: 3.4 MMOL/L (ref 3.5–5.1)
RBC # BLD AUTO: 4.33 M/UL (ref 3.8–5.2)
SODIUM SERPL-SCNC: 137 MMOL/L (ref 136–145)
TROPONIN I SERPL HS-MCNC: 6 NG/L (ref 0–51)
WBC # BLD AUTO: 10.6 K/UL (ref 3.6–11)

## 2023-11-28 PROCEDURE — 85025 COMPLETE CBC W/AUTO DIFF WBC: CPT

## 2023-11-28 PROCEDURE — 6370000000 HC RX 637 (ALT 250 FOR IP): Performed by: EMERGENCY MEDICINE

## 2023-11-28 PROCEDURE — 1100000000 HC RM PRIVATE

## 2023-11-28 PROCEDURE — 94762 N-INVAS EAR/PLS OXIMTRY CONT: CPT

## 2023-11-28 PROCEDURE — 87635 SARS-COV-2 COVID-19 AMP PRB: CPT

## 2023-11-28 PROCEDURE — 94640 AIRWAY INHALATION TREATMENT: CPT

## 2023-11-28 PROCEDURE — 93005 ELECTROCARDIOGRAM TRACING: CPT | Performed by: EMERGENCY MEDICINE

## 2023-11-28 PROCEDURE — 99285 EMERGENCY DEPT VISIT HI MDM: CPT

## 2023-11-28 PROCEDURE — 96374 THER/PROPH/DIAG INJ IV PUSH: CPT

## 2023-11-28 PROCEDURE — 80048 BASIC METABOLIC PNL TOTAL CA: CPT

## 2023-11-28 PROCEDURE — 71045 X-RAY EXAM CHEST 1 VIEW: CPT

## 2023-11-28 PROCEDURE — 84484 ASSAY OF TROPONIN QUANT: CPT

## 2023-11-28 PROCEDURE — 87804 INFLUENZA ASSAY W/OPTIC: CPT

## 2023-11-28 PROCEDURE — 6360000002 HC RX W HCPCS: Performed by: EMERGENCY MEDICINE

## 2023-11-28 PROCEDURE — 36415 COLL VENOUS BLD VENIPUNCTURE: CPT

## 2023-11-28 RX ORDER — LORAZEPAM 0.5 MG/1
0.5 TABLET ORAL ONCE
Status: COMPLETED | OUTPATIENT
Start: 2023-11-28 | End: 2023-11-28

## 2023-11-28 RX ORDER — ACETAMINOPHEN 325 MG/1
650 TABLET ORAL EVERY 6 HOURS PRN
Status: DISCONTINUED | OUTPATIENT
Start: 2023-11-28 | End: 2023-11-30 | Stop reason: HOSPADM

## 2023-11-28 RX ORDER — POTASSIUM CHLORIDE 7.45 MG/ML
10 INJECTION INTRAVENOUS PRN
Status: DISCONTINUED | OUTPATIENT
Start: 2023-11-28 | End: 2023-11-30 | Stop reason: HOSPADM

## 2023-11-28 RX ORDER — MONTELUKAST SODIUM 10 MG/1
10 TABLET ORAL NIGHTLY
Status: DISCONTINUED | OUTPATIENT
Start: 2023-11-28 | End: 2023-11-28

## 2023-11-28 RX ORDER — ONDANSETRON 2 MG/ML
4 INJECTION INTRAMUSCULAR; INTRAVENOUS EVERY 6 HOURS PRN
Status: DISCONTINUED | OUTPATIENT
Start: 2023-11-28 | End: 2023-11-30 | Stop reason: HOSPADM

## 2023-11-28 RX ORDER — ALBUTEROL SULFATE 90 UG/1
2 AEROSOL, METERED RESPIRATORY (INHALATION) EVERY 6 HOURS PRN
Status: DISCONTINUED | OUTPATIENT
Start: 2023-11-28 | End: 2023-11-30 | Stop reason: HOSPADM

## 2023-11-28 RX ORDER — SODIUM CHLORIDE 9 MG/ML
INJECTION, SOLUTION INTRAVENOUS PRN
Status: DISCONTINUED | OUTPATIENT
Start: 2023-11-28 | End: 2023-11-30 | Stop reason: HOSPADM

## 2023-11-28 RX ORDER — HYDROCHLOROTHIAZIDE 25 MG/1
12.5 TABLET ORAL DAILY
Status: DISCONTINUED | OUTPATIENT
Start: 2023-11-29 | End: 2023-11-30 | Stop reason: HOSPADM

## 2023-11-28 RX ORDER — LOSARTAN POTASSIUM 50 MG/1
50 TABLET ORAL NIGHTLY
Status: DISCONTINUED | OUTPATIENT
Start: 2023-11-28 | End: 2023-11-30 | Stop reason: HOSPADM

## 2023-11-28 RX ORDER — DIPHENHYDRAMINE HYDROCHLORIDE 50 MG/ML
25 INJECTION INTRAMUSCULAR; INTRAVENOUS ONCE
Status: COMPLETED | OUTPATIENT
Start: 2023-11-28 | End: 2023-11-28

## 2023-11-28 RX ORDER — FLUTICASONE PROPIONATE 50 MCG
2 SPRAY, SUSPENSION (ML) NASAL PRN
Status: DISCONTINUED | OUTPATIENT
Start: 2023-11-28 | End: 2023-11-29

## 2023-11-28 RX ORDER — ONDANSETRON 4 MG/1
4 TABLET, ORALLY DISINTEGRATING ORAL EVERY 8 HOURS PRN
Status: DISCONTINUED | OUTPATIENT
Start: 2023-11-28 | End: 2023-11-30 | Stop reason: HOSPADM

## 2023-11-28 RX ORDER — SODIUM CHLORIDE 0.9 % (FLUSH) 0.9 %
5-40 SYRINGE (ML) INJECTION PRN
Status: DISCONTINUED | OUTPATIENT
Start: 2023-11-28 | End: 2023-11-30 | Stop reason: HOSPADM

## 2023-11-28 RX ORDER — POTASSIUM CHLORIDE 20 MEQ/1
40 TABLET, EXTENDED RELEASE ORAL PRN
Status: DISCONTINUED | OUTPATIENT
Start: 2023-11-28 | End: 2023-11-30 | Stop reason: HOSPADM

## 2023-11-28 RX ORDER — LEVOTHYROXINE SODIUM 0.03 MG/1
25 TABLET ORAL
Status: DISCONTINUED | OUTPATIENT
Start: 2023-11-29 | End: 2023-11-28

## 2023-11-28 RX ORDER — SERTRALINE HYDROCHLORIDE 25 MG/1
50 TABLET, FILM COATED ORAL DAILY
Status: DISCONTINUED | OUTPATIENT
Start: 2023-11-29 | End: 2023-11-28

## 2023-11-28 RX ORDER — FLUCONAZOLE 100 MG/1
200 TABLET ORAL ONCE
Status: COMPLETED | OUTPATIENT
Start: 2023-11-28 | End: 2023-11-29

## 2023-11-28 RX ORDER — ENOXAPARIN SODIUM 100 MG/ML
30 INJECTION SUBCUTANEOUS 2 TIMES DAILY
Status: DISCONTINUED | OUTPATIENT
Start: 2023-11-28 | End: 2023-11-30 | Stop reason: HOSPADM

## 2023-11-28 RX ORDER — MAGNESIUM SULFATE IN WATER 40 MG/ML
2000 INJECTION, SOLUTION INTRAVENOUS PRN
Status: DISCONTINUED | OUTPATIENT
Start: 2023-11-28 | End: 2023-11-30 | Stop reason: HOSPADM

## 2023-11-28 RX ORDER — LOSARTAN POTASSIUM AND HYDROCHLOROTHIAZIDE 12.5; 1 MG/1; MG/1
1 TABLET ORAL DAILY
Status: DISCONTINUED | OUTPATIENT
Start: 2023-11-29 | End: 2023-11-28

## 2023-11-28 RX ORDER — PROPRANOLOL HCL 60 MG
120 CAPSULE, EXTENDED RELEASE 24HR ORAL DAILY
Status: DISCONTINUED | OUTPATIENT
Start: 2023-11-29 | End: 2023-11-30 | Stop reason: HOSPADM

## 2023-11-28 RX ORDER — POLYETHYLENE GLYCOL 3350 17 G/17G
17 POWDER, FOR SOLUTION ORAL DAILY PRN
Status: DISCONTINUED | OUTPATIENT
Start: 2023-11-28 | End: 2023-11-30 | Stop reason: HOSPADM

## 2023-11-28 RX ORDER — ACETAMINOPHEN 650 MG/1
650 SUPPOSITORY RECTAL EVERY 6 HOURS PRN
Status: DISCONTINUED | OUTPATIENT
Start: 2023-11-28 | End: 2023-11-30 | Stop reason: HOSPADM

## 2023-11-28 RX ORDER — LEVOTHYROXINE SODIUM 0.03 MG/1
75 TABLET ORAL
Status: DISCONTINUED | OUTPATIENT
Start: 2023-11-29 | End: 2023-11-30 | Stop reason: HOSPADM

## 2023-11-28 RX ORDER — CETIRIZINE HYDROCHLORIDE 10 MG/1
10 TABLET ORAL NIGHTLY
Status: DISCONTINUED | OUTPATIENT
Start: 2023-11-28 | End: 2023-11-30 | Stop reason: HOSPADM

## 2023-11-28 RX ORDER — PANTOPRAZOLE SODIUM 40 MG/1
40 TABLET, DELAYED RELEASE ORAL
Status: DISCONTINUED | OUTPATIENT
Start: 2023-11-29 | End: 2023-11-30 | Stop reason: HOSPADM

## 2023-11-28 RX ORDER — CETIRIZINE HYDROCHLORIDE 10 MG/1
10 TABLET ORAL DAILY
Status: DISCONTINUED | OUTPATIENT
Start: 2023-11-29 | End: 2023-11-28

## 2023-11-28 RX ORDER — SODIUM CHLORIDE 0.9 % (FLUSH) 0.9 %
5-40 SYRINGE (ML) INJECTION EVERY 12 HOURS SCHEDULED
Status: DISCONTINUED | OUTPATIENT
Start: 2023-11-28 | End: 2023-11-30 | Stop reason: HOSPADM

## 2023-11-28 RX ADMIN — DIPHENHYDRAMINE HYDROCHLORIDE 25 MG: 50 INJECTION INTRAMUSCULAR; INTRAVENOUS at 20:10

## 2023-11-28 RX ADMIN — LORAZEPAM 0.5 MG: 0.5 TABLET ORAL at 20:10

## 2023-11-28 RX ADMIN — ALBUTEROL SULFATE 2.5 MG: 2.5 SOLUTION RESPIRATORY (INHALATION) at 20:15

## 2023-11-28 ASSESSMENT — PAIN - FUNCTIONAL ASSESSMENT: PAIN_FUNCTIONAL_ASSESSMENT: NONE - DENIES PAIN

## 2023-11-28 NOTE — ED NOTES
Pt placed on continuous cardiac monitoring, pulse ox, and blood pressure monitoring at this time.         Cheryle Rosella, RN  11/28/23 8327

## 2023-11-28 NOTE — ED TRIAGE NOTES
Per EMS, pt complains of SOB that started tonight. States she was leaving her chemo treatment when the SOB started.  Pt received duo neb prior to arrival.

## 2023-11-29 LAB
FLUAV AG NPH QL IA: NEGATIVE
FLUBV AG NOSE QL IA: NEGATIVE
SARS-COV-2 RDRP RESP QL NAA+PROBE: NOT DETECTED

## 2023-11-29 PROCEDURE — 2580000003 HC RX 258: Performed by: INTERNAL MEDICINE

## 2023-11-29 PROCEDURE — 6370000000 HC RX 637 (ALT 250 FOR IP): Performed by: INTERNAL MEDICINE

## 2023-11-29 PROCEDURE — 2500000003 HC RX 250 WO HCPCS: Performed by: INTERNAL MEDICINE

## 2023-11-29 PROCEDURE — 6360000002 HC RX W HCPCS: Performed by: INTERNAL MEDICINE

## 2023-11-29 PROCEDURE — 94640 AIRWAY INHALATION TREATMENT: CPT

## 2023-11-29 PROCEDURE — 1100000000 HC RM PRIVATE

## 2023-11-29 RX ORDER — FLUCONAZOLE 100 MG/1
200 TABLET ORAL DAILY
Status: DISCONTINUED | OUTPATIENT
Start: 2023-11-29 | End: 2023-11-30 | Stop reason: HOSPADM

## 2023-11-29 RX ORDER — LOSARTAN POTASSIUM 100 MG/1
100 TABLET ORAL DAILY
COMMUNITY

## 2023-11-29 RX ORDER — AMLODIPINE BESYLATE 5 MG/1
5 TABLET ORAL DAILY
COMMUNITY

## 2023-11-29 RX ORDER — FLUTICASONE PROPIONATE 110 UG/1
1 AEROSOL, METERED RESPIRATORY (INHALATION)
Status: DISCONTINUED | OUTPATIENT
Start: 2023-11-29 | End: 2023-11-29

## 2023-11-29 RX ORDER — BUDESONIDE AND FORMOTEROL FUMARATE DIHYDRATE 160; 4.5 UG/1; UG/1
2 AEROSOL RESPIRATORY (INHALATION)
Status: DISCONTINUED | OUTPATIENT
Start: 2023-11-29 | End: 2023-11-30 | Stop reason: HOSPADM

## 2023-11-29 RX ORDER — PANTOPRAZOLE SODIUM 40 MG/1
40 TABLET, DELAYED RELEASE ORAL DAILY
COMMUNITY

## 2023-11-29 RX ORDER — FLUTICASONE PROPIONATE AND SALMETEROL 250; 50 UG/1; UG/1
POWDER RESPIRATORY (INHALATION)
COMMUNITY
Start: 2023-10-02

## 2023-11-29 RX ORDER — METHYLPREDNISOLONE SODIUM SUCCINATE 40 MG/ML
40 INJECTION, POWDER, LYOPHILIZED, FOR SOLUTION INTRAMUSCULAR; INTRAVENOUS EVERY 12 HOURS
Status: DISCONTINUED | OUTPATIENT
Start: 2023-11-29 | End: 2023-11-30 | Stop reason: HOSPADM

## 2023-11-29 RX ORDER — HYDROCHLOROTHIAZIDE 12.5 MG/1
12.5 CAPSULE, GELATIN COATED ORAL DAILY
COMMUNITY

## 2023-11-29 RX ADMIN — SODIUM CHLORIDE, PRESERVATIVE FREE 10 ML: 5 INJECTION INTRAVENOUS at 01:35

## 2023-11-29 RX ADMIN — FLUCONAZOLE 200 MG: 100 TABLET ORAL at 01:34

## 2023-11-29 RX ADMIN — ENOXAPARIN SODIUM 30 MG: 100 INJECTION SUBCUTANEOUS at 20:56

## 2023-11-29 RX ADMIN — FLUCONAZOLE 200 MG: 100 TABLET ORAL at 13:17

## 2023-11-29 RX ADMIN — METHYLPREDNISOLONE SODIUM SUCCINATE 40 MG: 40 INJECTION INTRAMUSCULAR; INTRAVENOUS at 13:17

## 2023-11-29 RX ADMIN — LEVOTHYROXINE SODIUM 75 MCG: 0.03 TABLET ORAL at 06:12

## 2023-11-29 RX ADMIN — ACETAMINOPHEN 650 MG: 325 TABLET ORAL at 06:12

## 2023-11-29 RX ADMIN — LOSARTAN POTASSIUM 50 MG: 50 TABLET, FILM COATED ORAL at 02:21

## 2023-11-29 RX ADMIN — CETIRIZINE HYDROCHLORIDE 10 MG: 10 TABLET, FILM COATED ORAL at 01:34

## 2023-11-29 RX ADMIN — SODIUM CHLORIDE, PRESERVATIVE FREE 10 ML: 5 INJECTION INTRAVENOUS at 20:58

## 2023-11-29 RX ADMIN — CETIRIZINE HYDROCHLORIDE 10 MG: 10 TABLET, FILM COATED ORAL at 20:56

## 2023-11-29 RX ADMIN — LOSARTAN POTASSIUM 50 MG: 50 TABLET, FILM COATED ORAL at 20:55

## 2023-11-29 RX ADMIN — DOXYCYCLINE 100 MG: 100 INJECTION, POWDER, LYOPHILIZED, FOR SOLUTION INTRAVENOUS at 13:20

## 2023-11-29 RX ADMIN — ACETAMINOPHEN 650 MG: 325 TABLET ORAL at 17:59

## 2023-11-29 RX ADMIN — SERTRALINE 100 MG: 50 TABLET, FILM COATED ORAL at 10:21

## 2023-11-29 RX ADMIN — Medication 1 PUFF: at 08:06

## 2023-11-29 RX ADMIN — PROPRANOLOL HYDROCHLORIDE 120 MG: 60 CAPSULE, EXTENDED RELEASE ORAL at 10:38

## 2023-11-29 RX ADMIN — ENOXAPARIN SODIUM 30 MG: 100 INJECTION SUBCUTANEOUS at 10:21

## 2023-11-29 RX ADMIN — SODIUM CHLORIDE, PRESERVATIVE FREE 10 ML: 5 INJECTION INTRAVENOUS at 10:39

## 2023-11-29 RX ADMIN — PANTOPRAZOLE SODIUM 40 MG: 40 TABLET, DELAYED RELEASE ORAL at 06:13

## 2023-11-29 RX ADMIN — Medication 2 PUFF: at 22:01

## 2023-11-29 RX ADMIN — HYDROCHLOROTHIAZIDE 12.5 MG: 25 TABLET ORAL at 10:21

## 2023-11-29 RX ADMIN — ENOXAPARIN SODIUM 30 MG: 100 INJECTION SUBCUTANEOUS at 01:34

## 2023-11-29 ASSESSMENT — PAIN DESCRIPTION - DESCRIPTORS: DESCRIPTORS: ACHING

## 2023-11-29 ASSESSMENT — PAIN SCALES - GENERAL
PAINLEVEL_OUTOF10: 3
PAINLEVEL_OUTOF10: 0
PAINLEVEL_OUTOF10: 0
PAINLEVEL_OUTOF10: 7

## 2023-11-29 ASSESSMENT — PAIN DESCRIPTION - LOCATION
LOCATION: HEAD
LOCATION: HEAD

## 2023-11-29 NOTE — H&P
observation  - given asx now, hold off systemic steroids. - albuterol PRN. Add fluticasone for better control    # Colon cancer  - consult oncology     # Essential hypertension   - Continue home medications. # Hypothyroidism  - Continue home medications. PO levothyroxine     # Vaginal candidiasis  - PO fluconazole once  - urinalysis     # Social Determents of health: None    # Full code as default. Need further clarification. # Medication reconciliation through patient.             Signed By: Benjamin Potter MD     November 28, 2023

## 2023-11-29 NOTE — CARE COORDINATION
11/29/23 1048   Service Assessment   Patient Orientation Alert and Oriented   Cognition Alert   History Provided By Patient; Child/Family   Primary Caregiver Self   Accompanied By/Relationship Daughter - [address]   Patient's Healthcare Decision Maker is: Legal Next of Kin   PCP Verified by CM Yes  ([facility]. Last seen on Monday 11/27/23.)   Last Visit to PCP Within last 3 months   Prior Functional Level Independent in ADLs/IADLs   Current Functional Level Independent in ADLs/IADLs   Can patient return to prior living arrangement Yes   Ability to make needs known: Good   Family able to assist with home care needs: Yes   Would you like for me to discuss the discharge plan with any other family members/significant others, and if so, who? Yes   Financial Resources None   [employer] None   CM/SW Referral Other (see comment)   Social/Functional History   Lives With Daughter     Cm met with pt and daughter Angelito Johns at the bedside to complete discharge assessment. Cm verified home address and emergency contact - Shruti Sinclair (daughter). Pt lives at home with her daughter's Angelito Johns and [employer]. Pt ambulates without DME. Pt has a nebulizer. Pt is not current with home health.      Pharmacy - [facility]

## 2023-11-30 ENCOUNTER — APPOINTMENT (OUTPATIENT)
Facility: HOSPITAL | Age: 78
DRG: 202 | End: 2023-11-30
Attending: INTERNAL MEDICINE
Payer: MEDICARE

## 2023-11-30 VITALS
WEIGHT: 260 LBS | TEMPERATURE: 98.2 F | HEIGHT: 62 IN | HEART RATE: 65 BPM | RESPIRATION RATE: 20 BRPM | DIASTOLIC BLOOD PRESSURE: 72 MMHG | SYSTOLIC BLOOD PRESSURE: 136 MMHG | OXYGEN SATURATION: 100 % | BODY MASS INDEX: 47.84 KG/M2

## 2023-11-30 PROBLEM — G47.33 MILD OBSTRUCTIVE SLEEP APNEA: Status: ACTIVE | Noted: 2023-11-30

## 2023-11-30 PROBLEM — J45.901 ASTHMA EXACERBATION: Status: ACTIVE | Noted: 2023-11-30

## 2023-11-30 LAB
ANION GAP SERPL CALC-SCNC: 8 MMOL/L (ref 5–15)
BUN SERPL-MCNC: 18 MG/DL (ref 6–20)
BUN/CREAT SERPL: 19 (ref 12–20)
CA-I BLD-MCNC: 8.6 MG/DL (ref 8.5–10.1)
CHLORIDE SERPL-SCNC: 101 MMOL/L (ref 97–108)
CO2 SERPL-SCNC: 26 MMOL/L (ref 21–32)
CREAT SERPL-MCNC: 0.97 MG/DL (ref 0.55–1.02)
ECHO AO ASC DIAM: 3.3 CM
ECHO AO ASCENDING AORTA INDEX: 1.54 CM/M2
ECHO AO ROOT DIAM: 2.6 CM
ECHO AO ROOT INDEX: 1.21 CM/M2
ECHO AV AREA PEAK VELOCITY: 2.6 CM2
ECHO AV AREA VTI: 2.8 CM2
ECHO AV AREA/BSA PEAK VELOCITY: 1.2 CM2/M2
ECHO AV AREA/BSA VTI: 1.3 CM2/M2
ECHO AV MEAN GRADIENT: 7 MMHG
ECHO AV MEAN VELOCITY: 1.2 M/S
ECHO AV PEAK GRADIENT: 12 MMHG
ECHO AV PEAK VELOCITY: 1.8 M/S
ECHO AV VELOCITY RATIO: 0.83
ECHO AV VTI: 36.6 CM
ECHO BSA: 2.27 M2
ECHO EST RA PRESSURE: 3 MMHG
ECHO LA AREA 4C: 13.3 CM2
ECHO LA DIAMETER INDEX: 1.78 CM/M2
ECHO LA DIAMETER: 3.8 CM
ECHO LA MAJOR AXIS: 4.8 CM
ECHO LA TO AORTIC ROOT RATIO: 1.46
ECHO LA VOL MOD A4C: 30 ML (ref 22–52)
ECHO LA VOLUME INDEX MOD A4C: 14 ML/M2 (ref 16–34)
ECHO LV E' LATERAL VELOCITY: 8 CM/S
ECHO LV E' SEPTAL VELOCITY: 5 CM/S
ECHO LV EDV A4C: 79 ML
ECHO LV EDV INDEX A4C: 37 ML/M2
ECHO LV EJECTION FRACTION A4C: 69 %
ECHO LV ESV A4C: 24 ML
ECHO LV ESV INDEX A4C: 11 ML/M2
ECHO LV FRACTIONAL SHORTENING: 48 % (ref 28–44)
ECHO LV INTERNAL DIMENSION DIASTOLE INDEX: 2.24 CM/M2
ECHO LV INTERNAL DIMENSION DIASTOLIC: 4.8 CM (ref 3.9–5.3)
ECHO LV INTERNAL DIMENSION SYSTOLIC INDEX: 1.17 CM/M2
ECHO LV INTERNAL DIMENSION SYSTOLIC: 2.5 CM
ECHO LV IVSD: 1.4 CM (ref 0.6–0.9)
ECHO LV MASS 2D: 232.2 G (ref 67–162)
ECHO LV MASS INDEX 2D: 108.5 G/M2 (ref 43–95)
ECHO LV POSTERIOR WALL DIASTOLIC: 1.1 CM (ref 0.6–0.9)
ECHO LV RELATIVE WALL THICKNESS RATIO: 0.46
ECHO LVOT AREA: 3.1 CM2
ECHO LVOT AV VTI INDEX: 0.9
ECHO LVOT DIAM: 2 CM
ECHO LVOT MEAN GRADIENT: 5 MMHG
ECHO LVOT PEAK GRADIENT: 9 MMHG
ECHO LVOT PEAK VELOCITY: 1.5 M/S
ECHO LVOT STROKE VOLUME INDEX: 48.6 ML/M2
ECHO LVOT SV: 103.9 ML
ECHO LVOT VTI: 33.1 CM
ECHO MV A VELOCITY: 0.77 M/S
ECHO MV AREA VTI: 3.1 CM2
ECHO MV E DECELERATION TIME (DT): 211 MS
ECHO MV E VELOCITY: 0.45 M/S
ECHO MV E/A RATIO: 0.58
ECHO MV E/E' LATERAL: 5.63
ECHO MV E/E' RATIO (AVERAGED): 7.31
ECHO MV LVOT VTI INDEX: 1.01
ECHO MV MAX VELOCITY: 0.9 M/S
ECHO MV MEAN GRADIENT: 2 MMHG
ECHO MV MEAN VELOCITY: 0.6 M/S
ECHO MV PEAK GRADIENT: 4 MMHG
ECHO MV REGURGITANT PEAK GRADIENT: 49 MMHG
ECHO MV REGURGITANT PEAK VELOCITY: 3.5 M/S
ECHO MV VTI: 33.3 CM
ECHO PULMONARY ARTERY END DIASTOLIC PRESSURE: 6 MMHG
ECHO PV MAX VELOCITY: 1.2 M/S
ECHO PV PEAK GRADIENT: 6 MMHG
ECHO PV REGURGITANT MAX VELOCITY: 1.3 M/S
ECHO RA AREA 4C: 13.7 CM2
ECHO RA END SYSTOLIC VOLUME APICAL 4 CHAMBER INDEX BSA: 14 ML/M2
ECHO RA VOLUME: 29 ML
ECHO RIGHT VENTRICULAR SYSTOLIC PRESSURE (RVSP): 21 MMHG
ECHO RV BASAL DIMENSION: 3.2 CM
ECHO RV FREE WALL PEAK S': 15 CM/S
ECHO RV TAPSE: 2.5 CM (ref 1.7–?)
ECHO TV REGURGITANT MAX VELOCITY: 2.1 M/S
ECHO TV REGURGITANT PEAK GRADIENT: 18 MMHG
EKG ATRIAL RATE: 66 BPM
EKG DIAGNOSIS: NORMAL
EKG P AXIS: 38 DEGREES
EKG P-R INTERVAL: 190 MS
EKG Q-T INTERVAL: 412 MS
EKG QRS DURATION: 114 MS
EKG QTC CALCULATION (BAZETT): 431 MS
EKG R AXIS: -41 DEGREES
EKG T AXIS: -44 DEGREES
EKG VENTRICULAR RATE: 66 BPM
ERYTHROCYTE [DISTWIDTH] IN BLOOD BY AUTOMATED COUNT: 12.9 % (ref 11.5–14.5)
GLUCOSE SERPL-MCNC: 209 MG/DL (ref 65–100)
HCT VFR BLD AUTO: 33.8 % (ref 35–47)
HGB BLD-MCNC: 10.6 G/DL (ref 11.5–16)
MAGNESIUM SERPL-MCNC: 2.2 MG/DL (ref 1.6–2.4)
MCH RBC QN AUTO: 26.8 PG (ref 26–34)
MCHC RBC AUTO-ENTMCNC: 31.4 G/DL (ref 30–36.5)
MCV RBC AUTO: 85.4 FL (ref 80–99)
NRBC # BLD: 0 K/UL (ref 0–0.01)
NRBC BLD-RTO: 0 PER 100 WBC
PLATELET # BLD AUTO: 404 K/UL (ref 150–400)
PMV BLD AUTO: 10.6 FL (ref 8.9–12.9)
POTASSIUM SERPL-SCNC: 3.1 MMOL/L (ref 3.5–5.1)
RBC # BLD AUTO: 3.96 M/UL (ref 3.8–5.2)
SODIUM SERPL-SCNC: 135 MMOL/L (ref 136–145)
WBC # BLD AUTO: 12.4 K/UL (ref 3.6–11)

## 2023-11-30 PROCEDURE — 80048 BASIC METABOLIC PNL TOTAL CA: CPT

## 2023-11-30 PROCEDURE — 6370000000 HC RX 637 (ALT 250 FOR IP): Performed by: INTERNAL MEDICINE

## 2023-11-30 PROCEDURE — 2580000003 HC RX 258: Performed by: INTERNAL MEDICINE

## 2023-11-30 PROCEDURE — 6360000002 HC RX W HCPCS: Performed by: INTERNAL MEDICINE

## 2023-11-30 PROCEDURE — 83735 ASSAY OF MAGNESIUM: CPT

## 2023-11-30 PROCEDURE — 85027 COMPLETE CBC AUTOMATED: CPT

## 2023-11-30 PROCEDURE — 2500000003 HC RX 250 WO HCPCS: Performed by: INTERNAL MEDICINE

## 2023-11-30 PROCEDURE — 94640 AIRWAY INHALATION TREATMENT: CPT

## 2023-11-30 PROCEDURE — 36415 COLL VENOUS BLD VENIPUNCTURE: CPT

## 2023-11-30 PROCEDURE — 93306 TTE W/DOPPLER COMPLETE: CPT

## 2023-11-30 RX ORDER — METHYLPREDNISOLONE 4 MG/1
TABLET ORAL
Qty: 1 KIT | Refills: 0 | Status: SHIPPED | OUTPATIENT
Start: 2023-11-30 | End: 2023-12-06

## 2023-11-30 RX ADMIN — ACETAMINOPHEN 650 MG: 325 TABLET ORAL at 09:33

## 2023-11-30 RX ADMIN — DOXYCYCLINE 100 MG: 100 INJECTION, POWDER, LYOPHILIZED, FOR SOLUTION INTRAVENOUS at 13:22

## 2023-11-30 RX ADMIN — PROPRANOLOL HYDROCHLORIDE 120 MG: 60 CAPSULE, EXTENDED RELEASE ORAL at 09:34

## 2023-11-30 RX ADMIN — Medication 2 PUFF: at 09:38

## 2023-11-30 RX ADMIN — ENOXAPARIN SODIUM 30 MG: 100 INJECTION SUBCUTANEOUS at 09:35

## 2023-11-30 RX ADMIN — METHYLPREDNISOLONE SODIUM SUCCINATE 40 MG: 40 INJECTION INTRAMUSCULAR; INTRAVENOUS at 00:24

## 2023-11-30 RX ADMIN — PANTOPRAZOLE SODIUM 40 MG: 40 TABLET, DELAYED RELEASE ORAL at 06:13

## 2023-11-30 RX ADMIN — SERTRALINE 100 MG: 50 TABLET, FILM COATED ORAL at 09:33

## 2023-11-30 RX ADMIN — METHYLPREDNISOLONE SODIUM SUCCINATE 40 MG: 40 INJECTION INTRAMUSCULAR; INTRAVENOUS at 13:24

## 2023-11-30 RX ADMIN — LEVOTHYROXINE SODIUM 75 MCG: 0.03 TABLET ORAL at 06:13

## 2023-11-30 RX ADMIN — HYDROCHLOROTHIAZIDE 12.5 MG: 25 TABLET ORAL at 09:33

## 2023-11-30 RX ADMIN — DOXYCYCLINE 100 MG: 100 INJECTION, POWDER, LYOPHILIZED, FOR SOLUTION INTRAVENOUS at 00:26

## 2023-11-30 RX ADMIN — SODIUM CHLORIDE, PRESERVATIVE FREE 10 ML: 5 INJECTION INTRAVENOUS at 09:41

## 2023-11-30 RX ADMIN — FLUCONAZOLE 200 MG: 100 TABLET ORAL at 09:33

## 2023-11-30 RX ADMIN — POTASSIUM CHLORIDE 40 MEQ: 1500 TABLET, EXTENDED RELEASE ORAL at 11:21

## 2023-11-30 ASSESSMENT — PAIN DESCRIPTION - LOCATION: LOCATION: NECK;SHOULDER

## 2023-11-30 ASSESSMENT — PAIN SCALES - GENERAL: PAINLEVEL_OUTOF10: 4

## 2023-11-30 NOTE — PLAN OF CARE
Problem: Discharge Planning  Goal: Discharge to home or other facility with appropriate resources  11/29/2023 2203 by Nicolas Galdamez RN  Outcome: Progressing  11/29/2023 1121 by Zheng Tillman RN  Outcome: Progressing     Problem: ABCDS Injury Assessment  Goal: Absence of physical injury  11/29/2023 2203 by Nicolas Galdamez RN  Outcome: Progressing  11/29/2023 1121 by Zheng Tillman RN  Outcome: Progressing     Problem: Safety - Adult  Goal: Free from fall injury  11/29/2023 2203 by Nicolas Galdamez RN  Outcome: Progressing  11/29/2023 1121 by Zheng Tillman RN  Outcome: Progressing     Problem: Respiratory - Adult  Goal: Achieves optimal ventilation and oxygenation  11/29/2023 2203 by Nicolas Galdamez RN  Outcome: Progressing  11/29/2023 1121 by Zheng Tillman RN  Outcome: Progressing     Problem: Nutrition Deficit:  Goal: Optimize nutritional status  Outcome: Progressing

## 2023-11-30 NOTE — CARE COORDINATION
DC Plan: Home with daughter's    Transition of Care Plan:    RUR: 14%  Prior Level of Functioning: independent  Disposition: N/A  If SNF or IPR: Date FOC offered: N/A  Date FOC received: N/A  Accepting facility: N/A  Date authorization started with reference number: N/A  Date authorization received and expires: N/A  Follow up appointments: Yes  DME needed: None  Transportation at discharge: family  IM/IMM Medicare/ letter given: N/A  Is patient a  and connected with VA? If yes, was [employer] transfer form completed and VA notified? N/A  Caregiver Contact: N/A  Discharge Caregiver contacted prior to discharge? N/A  Care Conference needed?  No  Barriers to discharge: None

## 2023-11-30 NOTE — DISCHARGE INSTR - COC
Weight:   Wt Readings from Last 1 Encounters:   11/28/23 117.9 kg (260 lb)     Mental Status:  oriented and alert    IV Access:  - None    Nursing Mobility/ADLs:  Walking   Independent  Transfer  Independent  Bathing  Independent  Dressing  Independent  Toileting  Independent  Feeding  Independent  Med C/Casia 10  Independent  Med Delivery   whole    Wound Care Documentation and Therapy:        Elimination:  Continence: Bowel: Yes  Bladder: Yes  Urinary Catheter: None   Colostomy/Ileostomy/Ileal Conduit: No       Date of Last BM: 11/2023  No intake or output data in the 24 hours ending 11/30/23 1604  I/O last 3 completed shifts: In: 10 [I.V.:10]  Out: -     Safety Concerns:     None    Impairments/Disabilities:      None    Nutrition Therapy:  Current Nutrition Therapy:   - Oral Diet:  General    Routes of Feeding: Oral  Liquids: Thin Liquids  Daily Fluid Restriction: no  Last Modified Barium Swallow with Video (Video Swallowing Test): not done    Treatments at the Time of Hospital Discharge:   Respiratory Treatments: N/A  Oxygen Therapy:  is not on home oxygen therapy.   Ventilator:    - No ventilator support    Rehab Therapies: None  Weight Bearing Status/Restrictions: No weight bearing restrictions  Other Medical Equipment (for information only, NOT a DME order):  None  Other Treatments: None     Patient's personal belongings (please select all that are sent with patient):  None    RN SIGNATURE:  Electronically signed by Ruddy Osuna RN on 11/30/23 at 4:07 PM EST    CASE MANAGEMENT/SOCIAL WORK SECTION    Inpatient Status Date: ***    Readmission Risk Assessment Score:  Readmission Risk              Risk of Unplanned Readmission:  13           Discharging to Facility/ Agency   Name:   Address:  Phone:  Fax:    Dialysis Facility (if applicable)   Name:  Address:  Dialysis Schedule:  Phone:  Fax:    / signature: {Esignature:[id number]}    PHYSICIAN SECTION    Prognosis:

## 2024-02-05 ENCOUNTER — HOSPITAL ENCOUNTER (EMERGENCY)
Facility: HOSPITAL | Age: 79
Discharge: HOME | End: 2024-02-05
Attending: STUDENT IN AN ORGANIZED HEALTH CARE EDUCATION/TRAINING PROGRAM
Payer: MEDICARE

## 2024-02-05 VITALS
RESPIRATION RATE: 25 BRPM | SYSTOLIC BLOOD PRESSURE: 117 MMHG | DIASTOLIC BLOOD PRESSURE: 72 MMHG | OXYGEN SATURATION: 97 % | BODY MASS INDEX: 38.28 KG/M2 | HEIGHT: 62 IN | WEIGHT: 208 LBS | HEART RATE: 78 BPM | TEMPERATURE: 98 F

## 2024-02-05 DIAGNOSIS — Z91.89 AT RISK FOR CLOSTRIDIOIDES DIFFICILE INFECTION: ICD-10-CM

## 2024-02-05 DIAGNOSIS — R19.7 DIARRHEA, UNSPECIFIED TYPE: Primary | ICD-10-CM

## 2024-02-05 LAB
ALBUMIN SERPL-MCNC: 2.4 G/DL (ref 3.5–5)
ALBUMIN/GLOB SERPL: 0.7 (ref 1.1–2.2)
ALP SERPL-CCNC: 61 U/L (ref 45–117)
ALT SERPL-CCNC: 16 U/L (ref 12–78)
ANION GAP SERPL CALC-SCNC: 7 MMOL/L (ref 5–15)
AST SERPL W P-5'-P-CCNC: 18 U/L (ref 15–37)
BASOPHILS # BLD: 0.1 K/UL (ref 0–0.1)
BASOPHILS NFR BLD: 1 % (ref 0–1)
BILIRUB SERPL-MCNC: 0.8 MG/DL (ref 0.2–1)
BUN SERPL-MCNC: 14 MG/DL (ref 6–20)
BUN/CREAT SERPL: 17 (ref 12–20)
CA-I BLD-MCNC: 8.8 MG/DL (ref 8.5–10.1)
CHLORIDE SERPL-SCNC: 96 MMOL/L (ref 97–108)
CO2 SERPL-SCNC: 28 MMOL/L (ref 21–32)
CREAT SERPL-MCNC: 0.84 MG/DL (ref 0.55–1.02)
DIFFERENTIAL METHOD BLD: ABNORMAL
EOSINOPHIL # BLD: 0 K/UL (ref 0–0.4)
EOSINOPHIL NFR BLD: 0 % (ref 0–7)
ERYTHROCYTE [DISTWIDTH] IN BLOOD BY AUTOMATED COUNT: 21.1 % (ref 11.5–14.5)
GLOBULIN SER CALC-MCNC: 3.5 G/DL (ref 2–4)
GLUCOSE SERPL-MCNC: 103 MG/DL (ref 65–100)
HCT VFR BLD AUTO: 32.2 % (ref 35–47)
HGB BLD-MCNC: 11.1 G/DL (ref 11.5–16)
IMM GRANULOCYTES # BLD AUTO: 0 K/UL
IMM GRANULOCYTES NFR BLD AUTO: 0 %
LYMPHOCYTES # BLD: 4 K/UL (ref 0.8–3.5)
LYMPHOCYTES NFR BLD: 30 % (ref 12–49)
MAGNESIUM SERPL-MCNC: 1.8 MG/DL (ref 1.6–2.4)
MCH RBC QN AUTO: 31 PG (ref 26–34)
MCHC RBC AUTO-ENTMCNC: 34.5 G/DL (ref 30–36.5)
MCV RBC AUTO: 89.9 FL (ref 80–99)
METAMYELOCYTES NFR BLD MANUAL: 1 %
MONOCYTES # BLD: 2.3 K/UL (ref 0–1)
MONOCYTES NFR BLD: 17 % (ref 5–13)
NEUTS BAND NFR BLD MANUAL: 6 % (ref 0–6)
NEUTS SEG # BLD: 6.8 K/UL (ref 1.8–8)
NEUTS SEG NFR BLD: 45 % (ref 32–75)
NRBC # BLD: 0.04 K/UL (ref 0–0.01)
NRBC BLD-RTO: 0.3 PER 100 WBC
PHOSPHATE SERPL-MCNC: 3 MG/DL (ref 2.6–4.7)
PLATELET # BLD AUTO: 324 K/UL (ref 150–400)
PMV BLD AUTO: 9 FL (ref 8.9–12.9)
POTASSIUM SERPL-SCNC: 3.5 MMOL/L (ref 3.5–5.1)
PROT SERPL-MCNC: 5.9 G/DL (ref 6.4–8.2)
RBC # BLD AUTO: 3.58 M/UL (ref 3.8–5.2)
RBC MORPH BLD: ABNORMAL
SODIUM SERPL-SCNC: 131 MMOL/L (ref 136–145)
WBC # BLD AUTO: 13.3 K/UL (ref 3.6–11)

## 2024-02-05 PROCEDURE — 99283 EMERGENCY DEPT VISIT LOW MDM: CPT

## 2024-02-05 PROCEDURE — 83735 ASSAY OF MAGNESIUM: CPT

## 2024-02-05 PROCEDURE — 84100 ASSAY OF PHOSPHORUS: CPT

## 2024-02-05 PROCEDURE — 80053 COMPREHEN METABOLIC PANEL: CPT

## 2024-02-05 PROCEDURE — 36415 COLL VENOUS BLD VENIPUNCTURE: CPT

## 2024-02-05 PROCEDURE — 85025 COMPLETE CBC W/AUTO DIFF WBC: CPT

## 2024-02-05 PROCEDURE — 87506 IADNA-DNA/RNA PROBE TQ 6-11: CPT

## 2024-02-05 RX ORDER — VANCOMYCIN HYDROCHLORIDE 125 MG/1
125 CAPSULE ORAL 4 TIMES DAILY
Qty: 40 CAPSULE | Refills: 0 | Status: SHIPPED | OUTPATIENT
Start: 2024-02-05 | End: 2024-02-15

## 2024-02-05 ASSESSMENT — LIFESTYLE VARIABLES
HOW OFTEN DO YOU HAVE A DRINK CONTAINING ALCOHOL: NEVER
HOW MANY STANDARD DRINKS CONTAINING ALCOHOL DO YOU HAVE ON A TYPICAL DAY: PATIENT DOES NOT DRINK

## 2024-02-05 ASSESSMENT — PAIN - FUNCTIONAL ASSESSMENT
PAIN_FUNCTIONAL_ASSESSMENT: NONE - DENIES PAIN
PAIN_FUNCTIONAL_ASSESSMENT: NONE - DENIES PAIN

## 2024-02-05 NOTE — ED TRIAGE NOTES
Cc of diarrhea for 3 weeks, wants c diff testing, hx of cancer was on chemo but not actively taking chemo.

## 2024-02-06 LAB
C COLI+JEJUNI TUF STL QL NAA+PROBE: NEGATIVE
EC STX1+STX2 GENES STL QL NAA+PROBE: NEGATIVE
ETEC ELTA+ESTB GENES STL QL NAA+PROBE: NEGATIVE
P SHIGELLOIDES DNA STL QL NAA+PROBE: NEGATIVE
SALMONELLA SP SPAO STL QL NAA+PROBE: NEGATIVE
SHIGELLA SP+EIEC IPAH STL QL NAA+PROBE: NEGATIVE
V CHOL+PARA+VUL DNA STL QL NAA+NON-PROBE: NEGATIVE
Y ENTEROCOL DNA STL QL NAA+NON-PROBE: NEGATIVE

## 2024-02-06 NOTE — ED NOTES
Pt aware of need for stool specimen. Pt reports has not had a bowel movement since she's been here and doesn't feel like she needs to at this time

## 2024-02-06 NOTE — ED NOTES
Ambulated pt to bathroom. Instructed pt to use emergency call bell when she was done and I would assist her back to the room.

## 2024-02-06 NOTE — ED PROVIDER NOTES
[facility] EMERGENCY DEPT  EMERGENCY DEPARTMENT HISTORY AND PHYSICAL EXAM      Date: 2024  Patient Name: Taryn Corona  MRN: [id number]  Birthdate 1945  Date of evaluation: 2024  Provider: Jerald Hennessy MD   Note Started: 8:33 PM EST 24    HISTORY OF PRESENT ILLNESS     Chief Complaint   Patient presents with    Diarrhea       History Provided By: Patient, patient's family    HPI: Taryn Corona is a 78 y.o. female with past medical history as reviewed below presents for evaluation of diarrhea.  Patient endorses watery diarrhea this been present for the last 3 weeks, after finishing a course of oral chemotherapy.  Patient also recently diagnosed with thrush, has been undergoing treatment for that and is in the midst of treatment.  No associated abdominal pain.  Diarrhea is nonbloody.  No associated vomiting.  No recent antibiotics.    PAST MEDICAL HISTORY   Past Medical History:  Past Medical History:   Diagnosis Date    Acid reflux     Asthma     Cancer (HCC)     Hypercholesteremia     Hypertension     Hypothyroidism        Past Surgical History:  Past Surgical History:   Procedure Laterality Date    BREAST SURGERY      cyst removed    COLONOSCOPY N/A 2023    COLONOSCOPY performed by Kiki Myers MD at [facility] ENDOSCOPY    COLONOSCOPY N/A 2023    COLONOSCOPY WITH BIOPSY performed by Kiki Myers MD at [facility] ENDOSCOPY    UPPER GASTROINTESTINAL ENDOSCOPY N/A 2023    EGD POLYP SNARE performed by Kiki Myers MD at [facility] ENDOSCOPY       Family History:  No family history on file.    Social History:  Social History     Tobacco Use    Smoking status: Former     Current packs/day: 0.00     Types: Cigarettes     Quit date: 2013     Years since quittin.1     Passive exposure: Never    Smokeless tobacco: Never   Vaping Use    Vaping Use: Never used   Substance Use Topics    Alcohol use: Never    Drug use: Never       Allergies:  Allergies   Allergen Reactions    Clindamycin/Lincomycin

## 2024-04-05 ENCOUNTER — HOSPITAL ENCOUNTER (EMERGENCY)
Facility: HOSPITAL | Age: 79
Discharge: HOME | End: 2024-04-05
Attending: EMERGENCY MEDICINE
Payer: MEDICARE

## 2024-04-05 ENCOUNTER — APPOINTMENT (OUTPATIENT)
Facility: HOSPITAL | Age: 79
End: 2024-04-05
Payer: MEDICARE

## 2024-04-05 VITALS
RESPIRATION RATE: 19 BRPM | HEIGHT: 62 IN | OXYGEN SATURATION: 98 % | WEIGHT: 191 LBS | HEART RATE: 79 BPM | BODY MASS INDEX: 35.15 KG/M2 | SYSTOLIC BLOOD PRESSURE: 125 MMHG | TEMPERATURE: 98.4 F | DIASTOLIC BLOOD PRESSURE: 75 MMHG

## 2024-04-05 DIAGNOSIS — J12.82 PNEUMONIA DUE TO COVID-19 VIRUS: ICD-10-CM

## 2024-04-05 DIAGNOSIS — J18.9 PNEUMONIA OF RIGHT UPPER LOBE DUE TO INFECTIOUS ORGANISM: Primary | ICD-10-CM

## 2024-04-05 DIAGNOSIS — U07.1 PNEUMONIA DUE TO COVID-19 VIRUS: ICD-10-CM

## 2024-04-05 LAB
ALBUMIN SERPL-MCNC: 3.3 G/DL (ref 3.5–5)
ALBUMIN/GLOB SERPL: 1.2 (ref 1.1–2.2)
ALP SERPL-CCNC: 56 U/L (ref 45–117)
ALT SERPL-CCNC: 30 U/L (ref 12–78)
ANION GAP SERPL CALC-SCNC: 5 MMOL/L (ref 5–15)
APPEARANCE UR: ABNORMAL
AST SERPL W P-5'-P-CCNC: 55 U/L (ref 15–37)
BACTERIA URNS QL MICRO: NEGATIVE /HPF
BASOPHILS # BLD: 0 K/UL (ref 0–0.1)
BASOPHILS NFR BLD: 0 % (ref 0–1)
BILIRUB SERPL-MCNC: 0.5 MG/DL (ref 0.2–1)
BILIRUB UR QL CFM: NEGATIVE
BILIRUB UR QL: ABNORMAL
BUN SERPL-MCNC: 10 MG/DL (ref 6–20)
BUN/CREAT SERPL: 14 (ref 12–20)
CA-I BLD-MCNC: 8.5 MG/DL (ref 8.5–10.1)
CHLORIDE SERPL-SCNC: 100 MMOL/L (ref 97–108)
CO2 SERPL-SCNC: 32 MMOL/L (ref 21–32)
COLOR UR: ABNORMAL
CREAT SERPL-MCNC: 0.72 MG/DL (ref 0.55–1.02)
DIFFERENTIAL METHOD BLD: ABNORMAL
EOSINOPHIL # BLD: 0 K/UL (ref 0–0.4)
EOSINOPHIL NFR BLD: 0 % (ref 0–7)
EPITH CASTS URNS QL MICRO: ABNORMAL /LPF
ERYTHROCYTE [DISTWIDTH] IN BLOOD BY AUTOMATED COUNT: 15.1 % (ref 11.5–14.5)
FLUAV AG NPH QL IA: NEGATIVE
FLUBV AG NOSE QL IA: NEGATIVE
GLOBULIN SER CALC-MCNC: 2.7 G/DL (ref 2–4)
GLUCOSE SERPL-MCNC: 105 MG/DL (ref 65–100)
GLUCOSE UR STRIP.AUTO-MCNC: NEGATIVE MG/DL
HCT VFR BLD AUTO: 33.5 % (ref 35–47)
HGB BLD-MCNC: 11 G/DL (ref 11.5–16)
HGB UR QL STRIP: NEGATIVE
IMM GRANULOCYTES # BLD AUTO: 0 K/UL
IMM GRANULOCYTES NFR BLD AUTO: 0 %
KETONES UR QL STRIP.AUTO: NEGATIVE MG/DL
LEUKOCYTE ESTERASE UR QL STRIP.AUTO: NEGATIVE
LYMPHOCYTES # BLD: 1.6 K/UL (ref 0.8–3.5)
LYMPHOCYTES NFR BLD: 84 % (ref 12–49)
MCH RBC QN AUTO: 28.5 PG (ref 26–34)
MCHC RBC AUTO-ENTMCNC: 32.8 G/DL (ref 30–36.5)
MCV RBC AUTO: 86.8 FL (ref 80–99)
MONOCYTES # BLD: 0.2 K/UL (ref 0–1)
MONOCYTES NFR BLD: 12 % (ref 5–13)
MUCOUS THREADS URNS QL MICRO: ABNORMAL /LPF
NEUTS SEG # BLD: 0.1 K/UL (ref 1.8–8)
NEUTS SEG NFR BLD: 4 % (ref 32–75)
NITRITE UR QL STRIP.AUTO: NEGATIVE
NRBC # BLD: 0 K/UL (ref 0–0.01)
NRBC BLD-RTO: 0 PER 100 WBC
PH UR STRIP: 5 (ref 5–8)
PLATELET # BLD AUTO: 163 K/UL (ref 150–400)
PMV BLD AUTO: 10.3 FL (ref 8.9–12.9)
POTASSIUM SERPL-SCNC: 2.8 MMOL/L (ref 3.5–5.1)
PROT SERPL-MCNC: 6 G/DL (ref 6.4–8.2)
PROT UR STRIP-MCNC: 100 MG/DL
RBC # BLD AUTO: 3.86 M/UL (ref 3.8–5.2)
RBC #/AREA URNS HPF: ABNORMAL /HPF (ref 0–5)
RBC MORPH BLD: ABNORMAL
SARS-COV-2 RDRP RESP QL NAA+PROBE: DETECTED
SODIUM SERPL-SCNC: 137 MMOL/L (ref 136–145)
SP GR UR REFRACTOMETRY: >1.03 (ref 1–1.03)
TROPONIN I SERPL HS-MCNC: 23 NG/L (ref 0–51)
URINE CULTURE IF INDICATED: ABNORMAL
UROBILINOGEN UR QL STRIP.AUTO: 4 EU/DL (ref 0.1–1)
WBC # BLD AUTO: 1.9 K/UL (ref 3.6–11)
WBC URNS QL MICRO: ABNORMAL /HPF (ref 0–4)

## 2024-04-05 PROCEDURE — 84484 ASSAY OF TROPONIN QUANT: CPT

## 2024-04-05 PROCEDURE — 81001 URINALYSIS AUTO W/SCOPE: CPT

## 2024-04-05 PROCEDURE — 87804 INFLUENZA ASSAY W/OPTIC: CPT

## 2024-04-05 PROCEDURE — 6360000002 HC RX W HCPCS: Performed by: EMERGENCY MEDICINE

## 2024-04-05 PROCEDURE — 2580000003 HC RX 258: Performed by: EMERGENCY MEDICINE

## 2024-04-05 PROCEDURE — 96374 THER/PROPH/DIAG INJ IV PUSH: CPT

## 2024-04-05 PROCEDURE — 71046 X-RAY EXAM CHEST 2 VIEWS: CPT

## 2024-04-05 PROCEDURE — 99285 EMERGENCY DEPT VISIT HI MDM: CPT

## 2024-04-05 PROCEDURE — 87040 BLOOD CULTURE FOR BACTERIA: CPT

## 2024-04-05 PROCEDURE — 87635 SARS-COV-2 COVID-19 AMP PRB: CPT

## 2024-04-05 PROCEDURE — 85025 COMPLETE CBC W/AUTO DIFF WBC: CPT

## 2024-04-05 PROCEDURE — 80053 COMPREHEN METABOLIC PANEL: CPT

## 2024-04-05 PROCEDURE — 6370000000 HC RX 637 (ALT 250 FOR IP): Performed by: EMERGENCY MEDICINE

## 2024-04-05 PROCEDURE — 36415 COLL VENOUS BLD VENIPUNCTURE: CPT

## 2024-04-05 RX ORDER — POTASSIUM CHLORIDE 750 MG/1
40 TABLET, FILM COATED, EXTENDED RELEASE ORAL ONCE
Status: COMPLETED | OUTPATIENT
Start: 2024-04-05 | End: 2024-04-05

## 2024-04-05 RX ORDER — AMOXICILLIN AND CLAVULANATE POTASSIUM 875; 125 MG/1; MG/1
1 TABLET, FILM COATED ORAL 2 TIMES DAILY
Qty: 14 TABLET | Refills: 0 | Status: SHIPPED | OUTPATIENT
Start: 2024-04-05 | End: 2024-04-12

## 2024-04-05 RX ORDER — DOXYCYCLINE HYCLATE 100 MG
100 TABLET ORAL 2 TIMES DAILY
Qty: 14 TABLET | Refills: 0 | Status: SHIPPED | OUTPATIENT
Start: 2024-04-05 | End: 2024-04-12

## 2024-04-05 RX ORDER — ACETAMINOPHEN 500 MG
1000 TABLET ORAL
Status: COMPLETED | OUTPATIENT
Start: 2024-04-05 | End: 2024-04-05

## 2024-04-05 RX ADMIN — POTASSIUM CHLORIDE 40 MEQ: 750 TABLET, EXTENDED RELEASE ORAL at 11:21

## 2024-04-05 RX ADMIN — CEFTRIAXONE SODIUM 2000 MG: 2 INJECTION, POWDER, FOR SOLUTION INTRAMUSCULAR; INTRAVENOUS at 10:34

## 2024-04-05 RX ADMIN — ACETAMINOPHEN 1000 MG: 500 TABLET ORAL at 10:04

## 2024-04-05 ASSESSMENT — PAIN SCALES - GENERAL
PAINLEVEL_OUTOF10: 0
PAINLEVEL_OUTOF10: 5

## 2024-04-05 ASSESSMENT — PAIN - FUNCTIONAL ASSESSMENT: PAIN_FUNCTIONAL_ASSESSMENT: NONE - DENIES PAIN

## 2024-04-05 ASSESSMENT — PAIN DESCRIPTION - LOCATION: LOCATION: KNEE

## 2024-04-05 NOTE — ED PROVIDER NOTES
EMERGENCY DEPARTMENT HISTORY AND PHYSICAL EXAM      Date: 4/5/2024  Patient Name: Taryn Corona      History of Presenting Illness     Chief Complaint   Patient presents with    Cough       History Provided By: patient, daughters    HPI: Taryn Corona, 79 y.o. female with a past medical history significant for hypothyroidism, colon CA s/p colectomy on chemo presents to the ED with cc of cough. Pt having some CP throughout chest, worse with coughing. Denies F/C but measured on in triage today. Had some loose stools past 2d days. No SOB. No dysuria, frequency, abd pain. Given Z-pack by Oncologist and started yesterday, still taking today but having worse coughing overnight so told to come in for x-ray. Unsure of what her chemo is.    There are no other complaints, changes, or physical findings at this time.    PCP: Afsaneh Alcaraz MD    No current facility-administered medications for this encounter.     Current Outpatient Medications   Medication Sig Dispense Refill    amoxicillin-clavulanate (AUGMENTIN) 875-125 MG per tablet Take 1 tablet by mouth 2 times daily for 7 days 14 tablet 0    doxycycline hyclate (VIBRA-TABS) 100 MG tablet Take 1 tablet by mouth 2 times daily for 7 days 14 tablet 0    fluticasone-salmeterol (ADVAIR DISKUS) 250-50 MCG/ACT AEPB diskus inhaler Inhale into the lungs      pantoprazole (PROTONIX) 40 MG tablet Take 1 tablet by mouth daily      losartan (COZAAR) 100 MG tablet Take 1 tablet by mouth daily      hydroCHLOROthiazide (MICROZIDE) 12.5 MG capsule Take 1 capsule by mouth daily      amLODIPine (NORVASC) 5 MG tablet Take 1 tablet by mouth daily      albuterol sulfate HFA (PROVENTIL;VENTOLIN;PROAIR) 108 (90 Base) MCG/ACT inhaler Inhale 2 puffs into the lungs every 6 hours as needed      cetirizine (ZYRTEC) 10 MG tablet Take 1 tablet by mouth daily      levothyroxine (SYNTHROID) 75 MCG tablet Take 1 tablet by mouth every morning (before breakfast)      montelukast (SINGULAIR) 10 MG

## 2024-04-05 NOTE — DISCHARGE INSTRUCTIONS
Differential Type Manual      RBC Comment Normocytic, Normochromic     CMP    Collection Time: 04/05/24  9:42 AM   Result Value Ref Range    Sodium 137 136 - 145 mmol/L    Potassium 2.8 (L) 3.5 - 5.1 mmol/L    Chloride 100 97 - 108 mmol/L    CO2 32 21 - 32 mmol/L    Anion Gap 5 5 - 15 mmol/L    Glucose 105 (H) 65 - 100 mg/dL    BUN 10 6 - 20 mg/dL    Creatinine 0.72 0.55 - 1.02 mg/dL    Bun/Cre Ratio 14 12 - 20      Est, Glom Filt Rate 86 >60 ml/min/1.73m2    Calcium 8.5 8.5 - 10.1 mg/dL    Total Bilirubin 0.5 0.2 - 1.0 mg/dL    AST 55 (H) 15 - 37 U/L    ALT 30 12 - 78 U/L    Alk Phosphatase 56 45 - 117 U/L    Total Protein 6.0 (L) 6.4 - 8.2 g/dL    Albumin 3.3 (L) 3.5 - 5.0 g/dL    Globulin 2.7 2.0 - 4.0 g/dL    Albumin/Globulin Ratio 1.2 1.1 - 2.2     Troponin    Collection Time: 04/05/24  9:42 AM   Result Value Ref Range    Troponin, High Sensitivity 23 0 - 51 ng/L   COVID-19, Rapid    Collection Time: 04/05/24  9:42 AM    Specimen: Nasopharyngeal   Result Value Ref Range    SARS-CoV-2, Rapid DETECTED (A) Not Detected     Rapid influenza A/B antigens    Collection Time: 04/05/24  9:42 AM    Specimen: Nasal Washing   Result Value Ref Range    Influenza A Ag Negative Negative      Influenza B Ag Negative Negative     Urinalysis with Reflex to Culture    Collection Time: 04/05/24  9:45 AM    Specimen: Urine   Result Value Ref Range    Color, UA Nicky      Appearance Turbid (A) Clear      Specific Gravity, UA >1.030 (H) 1.003 - 1.030    pH, Urine 5.0 5.0 - 8.0      Protein,  (A) Negative mg/dL    Glucose, UA Negative Negative mg/dL    Ketones, Urine Negative Negative mg/dL    Bilirubin Urine Small (A) Negative      Blood, Urine Negative Negative      Urobilinogen, Urine 4.0 (H) 0.1 - 1.0 EU/dL    Nitrite, Urine Negative Negative      Leukocyte Esterase, Urine Negative Negative      Bilirubin Confirmation, UA Negative Negative      Urine Culture if Indicated Culture not indicated by UA result Culture not

## 2024-04-07 LAB
BACTERIA SPEC CULT: NORMAL
BACTERIA SPEC CULT: NORMAL
Lab: NORMAL
Lab: NORMAL

## 2024-04-10 ENCOUNTER — APPOINTMENT (OUTPATIENT)
Facility: HOSPITAL | Age: 79
DRG: 871 | End: 2024-04-10
Payer: MEDICARE

## 2024-04-10 ENCOUNTER — HOSPITAL ENCOUNTER (INPATIENT)
Facility: HOSPITAL | Age: 79
LOS: 7 days | Discharge: HOME | DRG: 871 | End: 2024-04-17
Attending: STUDENT IN AN ORGANIZED HEALTH CARE EDUCATION/TRAINING PROGRAM | Admitting: HOSPITALIST
Payer: MEDICARE

## 2024-04-10 DIAGNOSIS — R94.31 PROLONGED Q-T INTERVAL ON ECG: ICD-10-CM

## 2024-04-10 DIAGNOSIS — R09.02 HYPOXIA: ICD-10-CM

## 2024-04-10 DIAGNOSIS — J18.9 MULTIFOCAL PNEUMONIA: Primary | ICD-10-CM

## 2024-04-10 PROBLEM — J12.82 PNEUMONIA DUE TO COVID-19 VIRUS: Status: ACTIVE | Noted: 2024-04-10

## 2024-04-10 PROBLEM — U07.1 PNEUMONIA DUE TO COVID-19 VIRUS: Status: ACTIVE | Noted: 2024-04-10

## 2024-04-10 PROBLEM — J96.01 ACUTE RESPIRATORY FAILURE WITH HYPOXIA (HCC): Status: ACTIVE | Noted: 2024-04-10

## 2024-04-10 LAB
ALBUMIN SERPL-MCNC: 3 G/DL (ref 3.5–5)
ALBUMIN/GLOB SERPL: 1 (ref 1.1–2.2)
ALP SERPL-CCNC: 60 U/L (ref 45–117)
ALT SERPL-CCNC: 21 U/L (ref 12–78)
ANION GAP SERPL CALC-SCNC: 6 MMOL/L (ref 5–15)
APPEARANCE UR: CLEAR
AST SERPL W P-5'-P-CCNC: 51 U/L (ref 15–37)
BACTERIA URNS QL MICRO: ABNORMAL /HPF
BASOPHILS # BLD: 0 K/UL (ref 0–0.1)
BASOPHILS NFR BLD: 0 % (ref 0–1)
BILIRUB SERPL-MCNC: 0.6 MG/DL (ref 0.2–1)
BILIRUB UR QL: NEGATIVE
BNP SERPL-MCNC: 344 PG/ML
BUN SERPL-MCNC: 5 MG/DL (ref 6–20)
BUN/CREAT SERPL: 7 (ref 12–20)
CA-I BLD-MCNC: 9.2 MG/DL (ref 8.5–10.1)
CHLORIDE SERPL-SCNC: 102 MMOL/L (ref 97–108)
CO2 SERPL-SCNC: 27 MMOL/L (ref 21–32)
COLOR UR: ABNORMAL
CREAT SERPL-MCNC: 0.71 MG/DL (ref 0.55–1.02)
DIFFERENTIAL METHOD BLD: ABNORMAL
EOSINOPHIL # BLD: 0 K/UL (ref 0–0.4)
EOSINOPHIL NFR BLD: 0 % (ref 0–7)
EPITH CASTS URNS QL MICRO: ABNORMAL /LPF
ERYTHROCYTE [DISTWIDTH] IN BLOOD BY AUTOMATED COUNT: 14.8 % (ref 11.5–14.5)
GLOBULIN SER CALC-MCNC: 2.9 G/DL (ref 2–4)
GLUCOSE SERPL-MCNC: 106 MG/DL (ref 65–100)
GLUCOSE UR STRIP.AUTO-MCNC: NEGATIVE MG/DL
HCT VFR BLD AUTO: 31.7 % (ref 35–47)
HGB BLD-MCNC: 10.2 G/DL (ref 11.5–16)
HGB UR QL STRIP: NEGATIVE
IMM GRANULOCYTES # BLD AUTO: 0 K/UL
IMM GRANULOCYTES NFR BLD AUTO: 0 %
KETONES UR QL STRIP.AUTO: NEGATIVE MG/DL
LEUKOCYTE ESTERASE UR QL STRIP.AUTO: NEGATIVE
LYMPHOCYTES # BLD: 1.4 K/UL (ref 0.8–3.5)
LYMPHOCYTES NFR BLD: 19 % (ref 12–49)
MCH RBC QN AUTO: 27.7 PG (ref 26–34)
MCHC RBC AUTO-ENTMCNC: 32.2 G/DL (ref 30–36.5)
MCV RBC AUTO: 86.1 FL (ref 80–99)
MONOCYTES # BLD: 0.9 K/UL (ref 0–1)
MONOCYTES NFR BLD: 12 % (ref 5–13)
MRSA DNA SPEC QL NAA+PROBE: NOT DETECTED
NEUTS BAND NFR BLD MANUAL: 2 % (ref 0–6)
NEUTS SEG # BLD: 5.1 K/UL (ref 1.8–8)
NEUTS SEG NFR BLD: 67 % (ref 32–75)
NITRITE UR QL STRIP.AUTO: NEGATIVE
NRBC # BLD: 0 K/UL (ref 0–0.01)
NRBC # BLD: 0.07 K/UL
NRBC BLD MANUAL-RTO: 1 PER 100 WBC
NRBC BLD-RTO: 0 PER 100 WBC
PH UR STRIP: 7 (ref 5–8)
PLATELET # BLD AUTO: 238 K/UL (ref 150–400)
PMV BLD AUTO: 10 FL (ref 8.9–12.9)
POTASSIUM SERPL-SCNC: 2.8 MMOL/L (ref 3.5–5.1)
PROCALCITONIN SERPL-MCNC: 0.07 NG/ML
PROT SERPL-MCNC: 5.9 G/DL (ref 6.4–8.2)
PROT UR STRIP-MCNC: NEGATIVE MG/DL
RBC # BLD AUTO: 3.68 M/UL (ref 3.8–5.2)
RBC #/AREA URNS HPF: ABNORMAL /HPF (ref 0–5)
RBC MORPH BLD: ABNORMAL
RBC MORPH BLD: ABNORMAL
SODIUM SERPL-SCNC: 135 MMOL/L (ref 136–145)
SP GR UR REFRACTOMETRY: 1 (ref 1–1.03)
TROPONIN I SERPL HS-MCNC: 10 NG/L (ref 0–51)
URINE CULTURE IF INDICATED: ABNORMAL
UROBILINOGEN UR QL STRIP.AUTO: 0.1 EU/DL (ref 0.1–1)
WBC # BLD AUTO: 7.4 K/UL (ref 3.6–11)
WBC URNS QL MICRO: ABNORMAL /HPF (ref 0–4)

## 2024-04-10 PROCEDURE — 96366 THER/PROPH/DIAG IV INF ADDON: CPT

## 2024-04-10 PROCEDURE — 96375 TX/PRO/DX INJ NEW DRUG ADDON: CPT

## 2024-04-10 PROCEDURE — 80053 COMPREHEN METABOLIC PANEL: CPT

## 2024-04-10 PROCEDURE — 36415 COLL VENOUS BLD VENIPUNCTURE: CPT

## 2024-04-10 PROCEDURE — 71275 CT ANGIOGRAPHY CHEST: CPT

## 2024-04-10 PROCEDURE — 71045 X-RAY EXAM CHEST 1 VIEW: CPT

## 2024-04-10 PROCEDURE — 84484 ASSAY OF TROPONIN QUANT: CPT

## 2024-04-10 PROCEDURE — 83880 ASSAY OF NATRIURETIC PEPTIDE: CPT

## 2024-04-10 PROCEDURE — 85025 COMPLETE CBC W/AUTO DIFF WBC: CPT

## 2024-04-10 PROCEDURE — 93005 ELECTROCARDIOGRAM TRACING: CPT | Performed by: STUDENT IN AN ORGANIZED HEALTH CARE EDUCATION/TRAINING PROGRAM

## 2024-04-10 PROCEDURE — 96365 THER/PROPH/DIAG IV INF INIT: CPT

## 2024-04-10 PROCEDURE — 2580000003 HC RX 258: Performed by: STUDENT IN AN ORGANIZED HEALTH CARE EDUCATION/TRAINING PROGRAM

## 2024-04-10 PROCEDURE — 99285 EMERGENCY DEPT VISIT HI MDM: CPT

## 2024-04-10 PROCEDURE — 87040 BLOOD CULTURE FOR BACTERIA: CPT

## 2024-04-10 PROCEDURE — 6370000000 HC RX 637 (ALT 250 FOR IP): Performed by: STUDENT IN AN ORGANIZED HEALTH CARE EDUCATION/TRAINING PROGRAM

## 2024-04-10 PROCEDURE — 2500000003 HC RX 250 WO HCPCS: Performed by: STUDENT IN AN ORGANIZED HEALTH CARE EDUCATION/TRAINING PROGRAM

## 2024-04-10 PROCEDURE — 6360000002 HC RX W HCPCS: Performed by: STUDENT IN AN ORGANIZED HEALTH CARE EDUCATION/TRAINING PROGRAM

## 2024-04-10 PROCEDURE — 84145 PROCALCITONIN (PCT): CPT

## 2024-04-10 PROCEDURE — 1100000000 HC RM PRIVATE

## 2024-04-10 PROCEDURE — 6360000004 HC RX CONTRAST MEDICATION: Performed by: STUDENT IN AN ORGANIZED HEALTH CARE EDUCATION/TRAINING PROGRAM

## 2024-04-10 PROCEDURE — 81001 URINALYSIS AUTO W/SCOPE: CPT

## 2024-04-10 PROCEDURE — 87641 MR-STAPH DNA AMP PROBE: CPT

## 2024-04-10 RX ORDER — ATORVASTATIN CALCIUM 40 MG/1
40 TABLET, FILM COATED ORAL DAILY
Status: ON HOLD | COMMUNITY
Start: 2024-03-01 | End: 2024-04-12

## 2024-04-10 RX ORDER — HYDRALAZINE HYDROCHLORIDE 25 MG/1
25 TABLET, FILM COATED ORAL 2 TIMES DAILY
Status: DISCONTINUED | OUTPATIENT
Start: 2024-04-10 | End: 2024-04-17 | Stop reason: HOSPADM

## 2024-04-10 RX ORDER — AMLODIPINE BESYLATE 5 MG/1
5 TABLET ORAL DAILY
Status: DISCONTINUED | OUTPATIENT
Start: 2024-04-11 | End: 2024-04-17 | Stop reason: HOSPADM

## 2024-04-10 RX ORDER — GUAIFENESIN 200 MG/10ML
200 LIQUID ORAL ONCE
Status: COMPLETED | OUTPATIENT
Start: 2024-04-10 | End: 2024-04-10

## 2024-04-10 RX ORDER — SODIUM CHLORIDE 0.9 % (FLUSH) 0.9 %
5-40 SYRINGE (ML) INJECTION PRN
Status: DISCONTINUED | OUTPATIENT
Start: 2024-04-10 | End: 2024-04-17 | Stop reason: HOSPADM

## 2024-04-10 RX ORDER — PROPRANOLOL HCL 60 MG
60 CAPSULE, EXTENDED RELEASE 24HR ORAL 2 TIMES DAILY
Status: ON HOLD | COMMUNITY
Start: 2024-03-04 | End: 2024-04-12

## 2024-04-10 RX ORDER — FLUTICASONE PROPIONATE AND SALMETEROL 50; 100 UG/1; UG/1
1 POWDER RESPIRATORY (INHALATION) 2 TIMES DAILY
Status: ON HOLD | COMMUNITY
Start: 2024-01-16 | End: 2024-04-12

## 2024-04-10 RX ORDER — SODIUM CHLORIDE 9 MG/ML
INJECTION, SOLUTION INTRAVENOUS CONTINUOUS
Status: DISCONTINUED | OUTPATIENT
Start: 2024-04-10 | End: 2024-04-12

## 2024-04-10 RX ORDER — DEXAMETHASONE 4 MG/1
6 TABLET ORAL DAILY
Status: DISCONTINUED | OUTPATIENT
Start: 2024-04-10 | End: 2024-04-17 | Stop reason: HOSPADM

## 2024-04-10 RX ORDER — IPRATROPIUM BROMIDE AND ALBUTEROL SULFATE 2.5; .5 MG/3ML; MG/3ML
1 SOLUTION RESPIRATORY (INHALATION)
Status: COMPLETED | OUTPATIENT
Start: 2024-04-10 | End: 2024-04-10

## 2024-04-10 RX ORDER — ACETAMINOPHEN 650 MG/1
650 SUPPOSITORY RECTAL EVERY 6 HOURS PRN
Status: DISCONTINUED | OUTPATIENT
Start: 2024-04-10 | End: 2024-04-17 | Stop reason: HOSPADM

## 2024-04-10 RX ORDER — LOSARTAN POTASSIUM 50 MG/1
50 TABLET ORAL DAILY
Status: DISCONTINUED | OUTPATIENT
Start: 2024-04-11 | End: 2024-04-17 | Stop reason: HOSPADM

## 2024-04-10 RX ORDER — SODIUM CHLORIDE 9 MG/ML
INJECTION, SOLUTION INTRAVENOUS PRN
Status: DISCONTINUED | OUTPATIENT
Start: 2024-04-10 | End: 2024-04-17 | Stop reason: HOSPADM

## 2024-04-10 RX ORDER — ONDANSETRON 2 MG/ML
4 INJECTION INTRAMUSCULAR; INTRAVENOUS EVERY 6 HOURS PRN
Status: DISCONTINUED | OUTPATIENT
Start: 2024-04-10 | End: 2024-04-17 | Stop reason: HOSPADM

## 2024-04-10 RX ORDER — ATORVASTATIN CALCIUM 40 MG/1
40 TABLET, FILM COATED ORAL DAILY
Status: DISCONTINUED | OUTPATIENT
Start: 2024-04-11 | End: 2024-04-13

## 2024-04-10 RX ORDER — ONDANSETRON 4 MG/1
4 TABLET, ORALLY DISINTEGRATING ORAL EVERY 8 HOURS PRN
Status: DISCONTINUED | OUTPATIENT
Start: 2024-04-10 | End: 2024-04-17 | Stop reason: HOSPADM

## 2024-04-10 RX ORDER — CAPECITABINE 500 MG/1
2000 TABLET, FILM COATED ORAL 2 TIMES DAILY
COMMUNITY
Start: 2024-01-09

## 2024-04-10 RX ORDER — LATANOPROST 50 UG/ML
1 SOLUTION/ DROPS OPHTHALMIC NIGHTLY
Status: DISCONTINUED | OUTPATIENT
Start: 2024-04-10 | End: 2024-04-17 | Stop reason: HOSPADM

## 2024-04-10 RX ORDER — ACETAMINOPHEN 325 MG/1
650 TABLET ORAL EVERY 6 HOURS PRN
Status: DISCONTINUED | OUTPATIENT
Start: 2024-04-10 | End: 2024-04-17 | Stop reason: HOSPADM

## 2024-04-10 RX ORDER — LEVOTHYROXINE SODIUM 0.03 MG/1
75 TABLET ORAL
Status: DISCONTINUED | OUTPATIENT
Start: 2024-04-11 | End: 2024-04-17 | Stop reason: HOSPADM

## 2024-04-10 RX ORDER — BUDESONIDE AND FORMOTEROL FUMARATE DIHYDRATE 160; 4.5 UG/1; UG/1
2 AEROSOL RESPIRATORY (INHALATION)
Status: DISCONTINUED | OUTPATIENT
Start: 2024-04-10 | End: 2024-04-17 | Stop reason: HOSPADM

## 2024-04-10 RX ORDER — ENOXAPARIN SODIUM 100 MG/ML
40 INJECTION SUBCUTANEOUS DAILY
Status: DISCONTINUED | OUTPATIENT
Start: 2024-04-11 | End: 2024-04-17 | Stop reason: HOSPADM

## 2024-04-10 RX ORDER — PROPRANOLOL HCL 60 MG
60 CAPSULE, EXTENDED RELEASE 24HR ORAL 2 TIMES DAILY
Status: DISCONTINUED | OUTPATIENT
Start: 2024-04-10 | End: 2024-04-11

## 2024-04-10 RX ORDER — 0.9 % SODIUM CHLORIDE 0.9 %
500 INTRAVENOUS SOLUTION INTRAVENOUS ONCE
Status: COMPLETED | OUTPATIENT
Start: 2024-04-10 | End: 2024-04-10

## 2024-04-10 RX ORDER — HYDROCHLOROTHIAZIDE 12.5 MG/1
12.5 TABLET ORAL DAILY
Status: ON HOLD | COMMUNITY
Start: 2024-01-19 | End: 2024-04-12

## 2024-04-10 RX ORDER — LATANOPROST 50 UG/ML
1 SOLUTION/ DROPS OPHTHALMIC NIGHTLY
COMMUNITY
Start: 2024-02-07

## 2024-04-10 RX ORDER — SODIUM CHLORIDE 0.9 % (FLUSH) 0.9 %
5-40 SYRINGE (ML) INJECTION EVERY 12 HOURS SCHEDULED
Status: DISCONTINUED | OUTPATIENT
Start: 2024-04-10 | End: 2024-04-17 | Stop reason: HOSPADM

## 2024-04-10 RX ORDER — HYDRALAZINE HYDROCHLORIDE 25 MG/1
25 TABLET, FILM COATED ORAL 2 TIMES DAILY
Status: ON HOLD | COMMUNITY
Start: 2024-03-28 | End: 2024-04-12

## 2024-04-10 RX ORDER — DOXYCYCLINE HYCLATE 100 MG
100 TABLET ORAL 2 TIMES DAILY
Status: DISCONTINUED | OUTPATIENT
Start: 2024-04-10 | End: 2024-04-11 | Stop reason: CLARIF

## 2024-04-10 RX ORDER — LOSARTAN POTASSIUM 50 MG/1
50 TABLET ORAL DAILY
COMMUNITY
Start: 2024-03-28

## 2024-04-10 RX ORDER — ALBUTEROL SULFATE 90 UG/1
2 AEROSOL, METERED RESPIRATORY (INHALATION) EVERY 6 HOURS PRN
Status: DISCONTINUED | OUTPATIENT
Start: 2024-04-10 | End: 2024-04-17 | Stop reason: HOSPADM

## 2024-04-10 RX ADMIN — IPRATROPIUM BROMIDE AND ALBUTEROL SULFATE 1 DOSE: 2.5; .5 SOLUTION RESPIRATORY (INHALATION) at 20:31

## 2024-04-10 RX ADMIN — IOPAMIDOL 100 ML: 755 INJECTION, SOLUTION INTRAVENOUS at 21:28

## 2024-04-10 RX ADMIN — SODIUM CHLORIDE 500 ML: 9 INJECTION, SOLUTION INTRAVENOUS at 19:34

## 2024-04-10 RX ADMIN — VANCOMYCIN HYDROCHLORIDE 2000 MG: 1 INJECTION, POWDER, LYOPHILIZED, FOR SOLUTION INTRAVENOUS at 20:20

## 2024-04-10 RX ADMIN — CEFEPIME 2000 MG: 2 INJECTION, POWDER, FOR SOLUTION INTRAVENOUS at 19:14

## 2024-04-10 RX ADMIN — GUAIFENESIN 200 MG: 200 SOLUTION ORAL at 19:14

## 2024-04-10 RX ADMIN — METHYLPREDNISOLONE SODIUM SUCCINATE 125 MG: 125 INJECTION INTRAMUSCULAR; INTRAVENOUS at 19:14

## 2024-04-10 RX ADMIN — IPRATROPIUM BROMIDE AND ALBUTEROL SULFATE 1 DOSE: 2.5; .5 SOLUTION RESPIRATORY (INHALATION) at 19:21

## 2024-04-10 ASSESSMENT — PAIN SCALES - GENERAL
PAINLEVEL_OUTOF10: 5
PAINLEVEL_OUTOF10: 8

## 2024-04-10 ASSESSMENT — PAIN - FUNCTIONAL ASSESSMENT
PAIN_FUNCTIONAL_ASSESSMENT: 0-10
PAIN_FUNCTIONAL_ASSESSMENT: 0-10

## 2024-04-10 NOTE — ED TRIAGE NOTES
Daughter reports that patient was diagnosed with COVID and pneumonia last Friday and cough has persisted, checked patients O2 levels at home and they were 73-75%. In triage O2 87%-94% on RA. Hx colon cancer - spread to lymph nodes

## 2024-04-10 NOTE — ED PROVIDER NOTES
known as: INDERAL LA     sertraline 50 MG tablet  Commonly known as: ZOLOFT  Ask about: Which instructions should I use?            5. Discontinued Medications:   Current Discharge Medication List          Procedures     Unless otherwise noted below, none.    Performed by: Jesus Mena MD   Procedures      Critical Care Time     CRITICAL CARE NOTE :  11:55 PM    Critical care time is being documented due to the fulfillment of at least one of the following:    - Critical conditions: condition that acutely impairs one or more vital organ systems such that there is a high probability of imminent or life-threatening deterioration in condition. Examples are diagnoses including but not limited to Afib RVR, DKA, PE, Etc..    - Critical interventions: an action whose failure to initiate would likely allow a sudden, clinically significant decline in the patient's condition. These include  Requirement of transfer or ICU admission  Contemplation or provision of tPA  Drip initiation (pressors, antiarrhythmics, heparin, etc.)  Antidotes given (narcan, charcoal, epi for anaphylaxis, etc..)  >=2L fluid bolus  >=3 Duonebs  >1 IV/IM doses of sedatives, antiepileptics, BP meds, rate control meds, adenosine.  Procedures that are suggestive of critical care: chest tubes, cardioversion, BiPAP, IO, etc..    Critical care time is documented based on continuous or non-continuous provision of care that includes face-to-face time, placing orders, chart review, documentation, discussion with consultants, discussion with family. This time calculation is a best approximation and does not include time spent on CPR, EKG interpretation, central line placement, intubation, laceration repairs, and other separately billed procedures.    Amount of Critical Care Time: 45 minutes    Details of critical care provision is documented above. A general summary is listed below:    IMPENDING DETERIORATION - Airway, Respiratory  ASSOCIATED RISK FACTORS -  Hypoxia  MANAGEMENT- Bedside Assessment  INTERPRETATION -  EKG, CXR  INTERVENTIONS - Hemodynamic Management  CASE REVIEW - Hospitalist,   TREATMENT RESPONSE - Stable/Improved  PERFORMED BY - Self    NOTES   :  During this entire length of time I was immediately available to the patient .    Jesus Mena MD    Documentation     I am the Primary Clinician of Record: Jesus Mena MD (electronically signed)    (Please note that parts of this dictation were completed with voice recognition software. Quite often unanticipated grammatical, syntax, homophones, and other interpretive errors are inadvertently transcribed by the computer software. Please disregards these errors. Please excuse any errors that have escaped final proofreading.)       Jesus Mena MD  04/10/24 [id number]

## 2024-04-11 LAB
25(OH)D3 SERPL-MCNC: 26.4 NG/ML (ref 30–100)
ALBUMIN SERPL-MCNC: 2.9 G/DL (ref 3.5–5)
ALBUMIN/GLOB SERPL: 0.7 (ref 1.1–2.2)
ALP SERPL-CCNC: 59 U/L (ref 45–117)
ALT SERPL-CCNC: 21 U/L (ref 12–78)
ANION GAP SERPL CALC-SCNC: 5 MMOL/L (ref 5–15)
AST SERPL W P-5'-P-CCNC: 44 U/L (ref 15–37)
BASOPHILS # BLD: 0 K/UL (ref 0–0.1)
BASOPHILS NFR BLD: 0 % (ref 0–1)
BILIRUB SERPL-MCNC: 0.5 MG/DL (ref 0.2–1)
BUN SERPL-MCNC: 6 MG/DL (ref 6–20)
BUN/CREAT SERPL: 9 (ref 12–20)
CA-I BLD-MCNC: 9.1 MG/DL (ref 8.5–10.1)
CHLORIDE SERPL-SCNC: 104 MMOL/L (ref 97–108)
CO2 SERPL-SCNC: 26 MMOL/L (ref 21–32)
CREAT SERPL-MCNC: 0.67 MG/DL (ref 0.55–1.02)
CRP SERPL-MCNC: 12.5 MG/DL (ref 0–0.3)
CRP SERPL-MCNC: 14.5 MG/DL (ref 0–0.3)
DIFFERENTIAL METHOD BLD: ABNORMAL
EKG ATRIAL RATE: 92 BPM
EKG DIAGNOSIS: NORMAL
EKG P AXIS: 40 DEGREES
EKG P-R INTERVAL: 140 MS
EKG Q-T INTERVAL: 456 MS
EKG QRS DURATION: 94 MS
EKG QTC CALCULATION (BAZETT): 563 MS
EKG R AXIS: -36 DEGREES
EKG T AXIS: -1 DEGREES
EKG VENTRICULAR RATE: 92 BPM
EOSINOPHIL # BLD: 0 K/UL (ref 0–0.4)
EOSINOPHIL NFR BLD: 0 % (ref 0–7)
ERYTHROCYTE [DISTWIDTH] IN BLOOD BY AUTOMATED COUNT: 15 % (ref 11.5–14.5)
GLOBULIN SER CALC-MCNC: 3.9 G/DL (ref 2–4)
GLUCOSE BLD STRIP.AUTO-MCNC: 145 MG/DL (ref 65–100)
GLUCOSE SERPL-MCNC: 159 MG/DL (ref 65–100)
HCT VFR BLD AUTO: 31.6 % (ref 35–47)
HGB BLD-MCNC: 10.2 G/DL (ref 11.5–16)
IMM GRANULOCYTES # BLD AUTO: 0 K/UL
IMM GRANULOCYTES NFR BLD AUTO: 0 %
INR PPP: 1.1 (ref 0.9–1.1)
LYMPHOCYTES # BLD: 0.8 K/UL (ref 0.8–3.5)
LYMPHOCYTES NFR BLD: 9 % (ref 12–49)
MCH RBC QN AUTO: 27.7 PG (ref 26–34)
MCHC RBC AUTO-ENTMCNC: 32.3 G/DL (ref 30–36.5)
MCV RBC AUTO: 85.9 FL (ref 80–99)
MONOCYTES # BLD: 0.2 K/UL (ref 0–1)
MONOCYTES NFR BLD: 2 % (ref 5–13)
NEUTS SEG # BLD: 7.4 K/UL (ref 1.8–8)
NEUTS SEG NFR BLD: 89 % (ref 32–75)
NRBC # BLD: 0 K/UL (ref 0–0.01)
NRBC BLD-RTO: 0 PER 100 WBC
PERFORMED BY:: ABNORMAL
PLATELET # BLD AUTO: 227 K/UL (ref 150–400)
PMV BLD AUTO: 9.5 FL (ref 8.9–12.9)
POTASSIUM SERPL-SCNC: 3.3 MMOL/L (ref 3.5–5.1)
PROT SERPL-MCNC: 6.8 G/DL (ref 6.4–8.2)
PROTHROMBIN TIME: 15.1 SEC (ref 11.9–14.6)
RBC # BLD AUTO: 3.68 M/UL (ref 3.8–5.2)
RBC MORPH BLD: ABNORMAL
SODIUM SERPL-SCNC: 135 MMOL/L (ref 136–145)
WBC # BLD AUTO: 8.4 K/UL (ref 3.6–11)

## 2024-04-11 PROCEDURE — 6370000000 HC RX 637 (ALT 250 FOR IP): Performed by: STUDENT IN AN ORGANIZED HEALTH CARE EDUCATION/TRAINING PROGRAM

## 2024-04-11 PROCEDURE — 36415 COLL VENOUS BLD VENIPUNCTURE: CPT

## 2024-04-11 PROCEDURE — 2580000003 HC RX 258: Performed by: HOSPITALIST

## 2024-04-11 PROCEDURE — 6370000000 HC RX 637 (ALT 250 FOR IP): Performed by: HOSPITALIST

## 2024-04-11 PROCEDURE — 80053 COMPREHEN METABOLIC PANEL: CPT

## 2024-04-11 PROCEDURE — 6360000002 HC RX W HCPCS: Performed by: HOSPITALIST

## 2024-04-11 PROCEDURE — 82306 VITAMIN D 25 HYDROXY: CPT

## 2024-04-11 PROCEDURE — 82962 GLUCOSE BLOOD TEST: CPT

## 2024-04-11 PROCEDURE — 85025 COMPLETE CBC W/AUTO DIFF WBC: CPT

## 2024-04-11 PROCEDURE — 85610 PROTHROMBIN TIME: CPT

## 2024-04-11 PROCEDURE — 86140 C-REACTIVE PROTEIN: CPT

## 2024-04-11 PROCEDURE — 94640 AIRWAY INHALATION TREATMENT: CPT

## 2024-04-11 PROCEDURE — 2580000003 HC RX 258: Performed by: INTERNAL MEDICINE

## 2024-04-11 PROCEDURE — 1100000000 HC RM PRIVATE

## 2024-04-11 PROCEDURE — 6370000000 HC RX 637 (ALT 250 FOR IP)

## 2024-04-11 PROCEDURE — 6360000002 HC RX W HCPCS: Performed by: INTERNAL MEDICINE

## 2024-04-11 PROCEDURE — 94761 N-INVAS EAR/PLS OXIMETRY MLT: CPT

## 2024-04-11 RX ORDER — IPRATROPIUM BROMIDE AND ALBUTEROL SULFATE 2.5; .5 MG/3ML; MG/3ML
1 SOLUTION RESPIRATORY (INHALATION) EVERY 4 HOURS PRN
Status: DISCONTINUED | OUTPATIENT
Start: 2024-04-11 | End: 2024-04-17 | Stop reason: HOSPADM

## 2024-04-11 RX ORDER — GUAIFENESIN/DEXTROMETHORPHAN 100-10MG/5
10 SYRUP ORAL EVERY 4 HOURS PRN
Status: DISCONTINUED | OUTPATIENT
Start: 2024-04-11 | End: 2024-04-13

## 2024-04-11 RX ORDER — LOPERAMIDE HYDROCHLORIDE 2 MG/1
2 CAPSULE ORAL 4 TIMES DAILY PRN
Status: DISCONTINUED | OUTPATIENT
Start: 2024-04-11 | End: 2024-04-17 | Stop reason: HOSPADM

## 2024-04-11 RX ORDER — FAMOTIDINE 20 MG/1
20 TABLET, FILM COATED ORAL 2 TIMES DAILY
Status: DISCONTINUED | OUTPATIENT
Start: 2024-04-11 | End: 2024-04-17 | Stop reason: HOSPADM

## 2024-04-11 RX ORDER — DOXYCYCLINE HYCLATE 100 MG/1
100 CAPSULE ORAL 2 TIMES DAILY
Status: COMPLETED | OUTPATIENT
Start: 2024-04-11 | End: 2024-04-17

## 2024-04-11 RX ADMIN — BARICITINIB 4 MG: 2 TABLET, FILM COATED ORAL at 12:22

## 2024-04-11 RX ADMIN — HYDRALAZINE HYDROCHLORIDE 25 MG: 25 TABLET ORAL at 00:52

## 2024-04-11 RX ADMIN — FAMOTIDINE 20 MG: 20 TABLET, FILM COATED ORAL at 13:54

## 2024-04-11 RX ADMIN — FAMOTIDINE 20 MG: 20 TABLET, FILM COATED ORAL at 19:56

## 2024-04-11 RX ADMIN — DEXAMETHASONE 6 MG: 4 TABLET ORAL at 09:04

## 2024-04-11 RX ADMIN — HYDRALAZINE HYDROCHLORIDE 25 MG: 25 TABLET ORAL at 09:04

## 2024-04-11 RX ADMIN — Medication 2 PUFF: at 22:11

## 2024-04-11 RX ADMIN — DEXTROMETHORPHAN HYDROBROMIDE, GUAIFENESIN 10 ML: 10; 100 LIQUID ORAL at 18:45

## 2024-04-11 RX ADMIN — DEXTROMETHORPHAN HYDROBROMIDE, GUAIFENESIN 10 ML: 10; 100 LIQUID ORAL at 13:54

## 2024-04-11 RX ADMIN — SODIUM CHLORIDE, PRESERVATIVE FREE 10 ML: 5 INJECTION INTRAVENOUS at 19:58

## 2024-04-11 RX ADMIN — SODIUM CHLORIDE, PRESERVATIVE FREE 10 ML: 5 INJECTION INTRAVENOUS at 00:54

## 2024-04-11 RX ADMIN — DOXYCYCLINE HYCLATE 100 MG: 100 CAPSULE ORAL at 04:04

## 2024-04-11 RX ADMIN — ENOXAPARIN SODIUM 40 MG: 100 INJECTION SUBCUTANEOUS at 09:07

## 2024-04-11 RX ADMIN — HYDRALAZINE HYDROCHLORIDE 25 MG: 25 TABLET ORAL at 19:56

## 2024-04-11 RX ADMIN — DOXYCYCLINE HYCLATE 100 MG: 100 CAPSULE ORAL at 12:22

## 2024-04-11 RX ADMIN — SODIUM CHLORIDE: 9 INJECTION, SOLUTION INTRAVENOUS at 18:45

## 2024-04-11 RX ADMIN — SODIUM CHLORIDE: 9 INJECTION, SOLUTION INTRAVENOUS at 00:47

## 2024-04-11 RX ADMIN — LATANOPROST 1 DROP: 50 SOLUTION OPHTHALMIC at 23:56

## 2024-04-11 RX ADMIN — Medication 2 PUFF: at 08:50

## 2024-04-11 RX ADMIN — LEVOTHYROXINE SODIUM 75 MCG: 0.03 TABLET ORAL at 06:06

## 2024-04-11 RX ADMIN — SERTRALINE HYDROCHLORIDE 50 MG: 50 TABLET ORAL at 09:04

## 2024-04-11 RX ADMIN — DOXYCYCLINE HYCLATE 100 MG: 100 CAPSULE ORAL at 23:58

## 2024-04-11 RX ADMIN — POTASSIUM BICARBONATE 40 MEQ: 782 TABLET, EFFERVESCENT ORAL at 00:52

## 2024-04-11 RX ADMIN — DEXAMETHASONE 6 MG: 4 TABLET ORAL at 00:50

## 2024-04-11 RX ADMIN — METHYLPREDNISOLONE SODIUM SUCCINATE 60 MG: 125 INJECTION INTRAMUSCULAR; INTRAVENOUS at 18:37

## 2024-04-11 RX ADMIN — LOPERAMIDE HYDROCHLORIDE 2 MG: 2 CAPSULE ORAL at 18:45

## 2024-04-11 RX ADMIN — LOSARTAN POTASSIUM 50 MG: 50 TABLET, FILM COATED ORAL at 09:04

## 2024-04-11 RX ADMIN — ACETAMINOPHEN 650 MG: 325 TABLET ORAL at 09:29

## 2024-04-11 RX ADMIN — ATORVASTATIN CALCIUM 40 MG: 40 TABLET, FILM COATED ORAL at 09:07

## 2024-04-11 ASSESSMENT — PAIN DESCRIPTION - LOCATION
LOCATION: GENERALIZED
LOCATION: GENERALIZED
LOCATION: KNEE

## 2024-04-11 ASSESSMENT — PAIN SCALES - GENERAL
PAINLEVEL_OUTOF10: 3
PAINLEVEL_OUTOF10: 5
PAINLEVEL_OUTOF10: 2

## 2024-04-11 ASSESSMENT — PAIN DESCRIPTION - DESCRIPTORS: DESCRIPTORS: ACHING

## 2024-04-11 ASSESSMENT — PAIN DESCRIPTION - ORIENTATION: ORIENTATION: RIGHT

## 2024-04-11 NOTE — CARE COORDINATION
04/11/24 1355   Service Assessment   Patient Orientation Alert and Oriented   Cognition Alert   History Provided By Patient;Child/Family   Primary Caregiver Self   Support Systems Children   Patient's Healthcare Decision Maker is: Legal Next of Kin   PCP Verified by CM Yes   Prior Functional Level Assistance with the following:;Mobility   Current Functional Level Assistance with the following:;Mobility   Can patient return to prior living arrangement Yes   Ability to make needs known: Good   Family able to assist with home care needs: Yes   Would you like for me to discuss the discharge plan with any other family members/significant others, and if so, who? Yes  (Daughter, Alva Corona)   Financial Resources Medicare   Community Resources None   Social/Functional History   Lives With Daughter   Type of Home Apartment  (3rd floor)   Home Access Elevator   Bathroom Equipment Shower chair   Home Equipment Wheelchair-manual;Rollator   Services At/After Discharge   Transition of Care Consult (CM Consult) Discharge Planning   Confirm Follow Up Transport Family     CM met with patient and daughter at bedside to complete DCP assessment. [employer] verified as accurate. Patient and daughter live in a 3rd floor apartment with an elevator. Prior to admission, patient independent with ADLS. DME: manual wheelchair, rollator, shower chair. No previous HH/IRF/SNF. Preferred pharmacy verified as [facility] in [place]. When medically stable for discharge, daughter will transport patient home. CM will continue to follow patient and recs of medical team.    Current Dispo: Home, No Needs.    Advance Care Planning     General Advance Care Planning (ACP) Conversation    Date of Conversation: 4/11/2024  Conducted with: Patient with Decision Making Capacity    Healthcare Decision Maker:  No healthcare decision makers have been documented.  Click here to complete HealthCare Decision Makers including selection of the Healthcare Decision

## 2024-04-11 NOTE — PROGRESS NOTES
Message sent to Sweetie STYLES letting her know patient is having heartburn and patient wants cough med. Patient states that she dont take norvasc or inderal. I told Sweetie STYLES that as well

## 2024-04-11 NOTE — PROGRESS NOTES
Hospitalist Progress Note    NAME:   Taryn Corona   : 1945   MRN: [id number]     Date/Time: 2024 2:52 PM  Patient PCP: Afsaneh Alcaraz MD    Estimated discharge date: 48 hours  Barriers: Wean O2    Hospital Course:  Taryn Corona is a 79-year-old female with hypertension, asthma, hyperlipidemia, hypothyroidism, colon cancer with metastasis on chemotherapy admitted 4/10/2024 for cough and shortness of breath.  Patient hypoxic and requiring 5 L via nasal cannula.  Patient tested positive for COVID at home.  CTA chest shows left lower lobe atelectasis, diffuse slob pleural fibrosis with groundglass opacities.  Pulmonary embolism ruled out.  Continue DuoNebs.  Continue oral steroids and baricitinib.    Assessment / Plan:  Acute respiratory failure with hypoxia  COVID-19, pneumonitis  - Continue supplementation with oxygen, titrating SpO2 90 to 95%.  Currently requiring 5 L via nasal cannula  - Continue oral steroid and baricitinib  - Continue DuoNebs    Chronic obstructive asthma  -Continue Symbicort (substituted for Advair)  - Continue DuoNebs    Hypokalemia  - 2.8> 3.3, continue supplementation and continue to monitor    Hypertension  -Continue amlodipine, hydralazine, losartan    Hypothyroidism  - Continue levothyroxine    Carcinoma of the colon with metastasis  -follows with [facility]  -CT report shows 19 mm nodule in the left breast, please correlate with any recent mammography.    Medical Decision Making:   [] High (any 2)    A. Problems (any 1)  [x] Acute/Chronic Illness/injury posing threat to life or bodily function: Acute respiratory failure with hypoxia  [] Severe exacerbation of chronic illness:    ---------------------------------------------------------------------  B. Risk of Treatment (any 1)   [] Drugs/treatments that require intensive monitoring for toxicity include:    [] IV ABX requiring serial renal monitoring for nephrotoxicity:     [] IV Narcotic analgesia for adverse drug  % -- --   04/11/24 0230 (!) 142/80 98.4 °F (36.9 °C) Oral 90 23 96 % -- --   04/11/24 0215 -- -- -- 86 27 96 % -- --   04/11/24 0200 (!) 158/96 -- -- 100 27 95 % -- --   04/11/24 0130 (!) 140/82 -- -- 84 28 97 % -- --   04/11/24 0100 (!) 138/109 -- -- 90 22 95 % -- --   04/11/24 0052 (!) 141/79 -- -- 87 23 96 % -- --   04/11/24 0045 (!) 141/79 -- -- 85 27 99 % -- --   04/11/24 0030 -- -- -- 81 24 96 % -- --   04/11/24 0015 -- -- -- 95 29 96 % -- --   04/11/24 0000 -- -- -- 84 29 97 % -- --   04/10/24 2345 -- -- -- 84 23 99 % -- --   04/10/24 2330 -- -- -- 85 23 98 % -- --   04/10/24 2315 -- -- -- 87 26 98 % -- --   04/10/24 2300 -- -- -- 89 23 97 % -- --   04/10/24 2245 -- -- -- 89 26 97 % -- --   04/10/24 2230 -- -- -- 90 26 97 % -- --   04/10/24 2215 -- -- -- 91 23 96 % -- --   04/10/24 2200 -- -- -- 95 23 92 % -- --   04/10/24 2145 -- -- -- (!) 105 27 (!) 89 % -- --   04/10/24 2130 -- -- -- 98 25 93 % -- --   04/10/24 2045 -- -- -- 89 21 91 % -- --   04/10/24 2030 135/76 -- -- 89 21 94 % -- --   04/10/24 2001 139/79 -- -- 97 22 93 % -- --   04/10/24 1945 132/75 -- -- 91 22 93 % -- --   04/10/24 1925 138/80 -- -- 88 18 93 % -- --   04/10/24 1838 (!) 147/77 -- -- -- -- (!) 82 % -- --   04/10/24 1817 131/75 98.6 °F (37 °C) Oral 91 20 95 % 1.575 m (5' 2\") 86.6 kg (191 lb)         Intake/Output Summary (Last 24 hours) at 4/11/2024 1452  Last data filed at 4/11/2024 0522  Gross per 24 hour   Intake 1090 ml   Output --   Net 1090 ml        I had a face to face encounter and independently examined this patient on 4/11/2024, as outlined below:    Review of Systems   Constitutional:  Negative for diaphoresis and fatigue.   Respiratory:  Positive for cough and shortness of breath.    Cardiovascular:  Negative for chest pain and leg swelling.   Gastrointestinal:  Positive for diarrhea. Negative for abdominal pain, blood in stool, nausea and vomiting.   Genitourinary:  Negative for difficulty urinating and dysuria.

## 2024-04-11 NOTE — PLAN OF CARE
Taryn Corona is a 79 y.o. female Admitted from ED in stable condition, for hypoxia,oriented pt and pt's daughter to pt room,bed at the lowest level with call light and personal belongings within reach,Daughter at the bedside.R AC IV dry,clean and intact infusing NS 75cc/hr.no respiratory distress noted.Covid isolation in place.       Problem: Discharge Planning  Goal: Discharge to home or other facility with appropriate resources  Outcome: Progressing  Flowsheets (Taken 4/11/2024 0451)  Discharge to home or other facility with appropriate resources:   Identify barriers to discharge with patient and caregiver   Identify discharge learning needs (meds, wound care, etc)     Problem: Pain  Goal: Verbalizes/displays adequate comfort level or baseline comfort level  Outcome: Progressing  Flowsheets (Taken 4/11/2024 0451)  Verbalizes/displays adequate comfort level or baseline comfort level:   Encourage patient to monitor pain and request assistance   Assess pain using appropriate pain scale   Administer analgesics based on type and severity of pain and evaluate response   Implement non-pharmacological measures as appropriate and evaluate response   Consider cultural and social influences on pain and pain management     Problem: ABCDS Injury Assessment  Goal: Absence of physical injury  Outcome: Progressing  Flowsheets (Taken 4/11/2024 0451)  Absence of Physical Injury: Implement safety measures based on patient assessment     Problem: Safety - Adult  Goal: Free from fall injury  Outcome: Progressing  Flowsheets (Taken 4/11/2024 0451)  Free From Fall Injury: Based on caregiver fall risk screen, instruct family/caregiver to ask for assistance with transferring infant if caregiver noted to have fall risk factors

## 2024-04-11 NOTE — PROGRESS NOTES
4 Eyes Skin Assessment     NAME:  Taryn Corona  YOB: 1945  MEDICAL RECORD NUMBER:  [id number]    The patient is being assessed for  Admission    I agree that at least one RN has performed a thorough Head to Toe Skin Assessment on the patient. ALL assessment sites listed below have been assessed.      Areas assessed by both nurses:    Head, Face, Ears, Shoulders, Back, Chest, Arms, Elbows, Hands, Sacrum. Buttock, Coccyx, Ischium, Legs. Feet and Heels, and Under Medical Devices         Does the Patient have a Wound? No noted wound(s)       Mook Prevention initiated by RN: Yes  Wound Care Orders initiated by RN: No    Pressure Injury (Stage 3,4, Unstageable, DTI, NWPT, and Complex wounds) if present, place Wound referral order by RN under : No    New Ostomies, if present place, Ostomy referral order under : No     Nurse 1 eSignature: Electronically signed by Ainsley Franks RN on 4/11/24 at 5:31 AM EDT    **SHARE this note so that the co-signing nurse can place an eSignature**    Nurse 2 eSignature: Electronically signed by Rosalba Evangelista RN on 4/17/24 at 11:39 AM EDT

## 2024-04-11 NOTE — H&P
Hospitalist History & Physical Notes.           [facility].              Name : Taryn Corona      MRN number : [id number]     YOB: 1945     Subjective :   Chief Complaint : Shortness of breath with hypoxia, positive for COVID.    Source of information : Patient.  Discussed with ED provider.  Reviewed previous records.    History of present illness:   Taryn Corona is  79 y.o. female with hypertension, hyperlipidemia, hypothyroidism and diagnosed with colon cancer with metastasis on treatment presents to the emergency room complaining of cough and shortness of breath.  States it is dry cough and feels tight in the chest, breathing is getting worse.  Feels tired with no energy, feels cold and chills did not check temperature.  She is afebrile in the emergency room.  She is found with Hypoxia with oxygen saturations around 75%, on arrival to the emergency room she is on 87% started on oxygen supplementation with improvement.  She has history of asthma but not on any oxygen.    She was tested positive for COVID at home.  CTA chest in the emergency room suggest left lower lobe atelectasis and diffuse subpleural fibrosis with groundglass opacities.  Pulmonary embolism ruled out.    Treated with nebulizer treatment in the emergency room with much improvement in her symptoms.  Due to patient immunocompromised status being on chemotherapy for carcinoma admitted for close monitoring.    Past Medical History:   Diagnosis Date    Acid reflux     Asthma     Cancer (HCC)     Hypercholesteremia     Hypertension     Hypothyroidism      Past Surgical History:   Procedure Laterality Date    BREAST SURGERY      cyst removed    COLONOSCOPY N/A 9/12/2023    COLONOSCOPY performed by Kiki Myers MD at [facility] ENDOSCOPY    COLONOSCOPY N/A 9/12/2023    COLONOSCOPY WITH BIOPSY performed by Kiki Myers MD at [facility] ENDOSCOPY    UPPER GASTROINTESTINAL ENDOSCOPY N/A 9/12/2023    EGD POLYP SNARE performed by Kiki  MD        sodium chloride flush 0.9 % injection 5-40 mL  5-40 mL IntraVENous 2 times per day Sai Morel MD        sodium chloride flush 0.9 % injection 5-40 mL  5-40 mL IntraVENous PRN Sai Morel MD        0.9 % sodium chloride infusion   IntraVENous PRN Sai Morel MD        [START ON 4/11/2024] enoxaparin (LOVENOX) injection 40 mg  40 mg SubCUTAneous Daily Sai Morel MD        ondansetron (ZOFRAN-ODT) disintegrating tablet 4 mg  4 mg Oral Q8H PRN Sai Morel MD        Or    ondansetron (ZOFRAN) injection 4 mg  4 mg IntraVENous Q6H PRN Sai Morel MD        acetaminophen (TYLENOL) tablet 650 mg  650 mg Oral Q6H PRN Sai Morel MD        Or    acetaminophen (TYLENOL) suppository 650 mg  650 mg Rectal Q6H PRN Sai Morel MD        0.9 % sodium chloride infusion   IntraVENous Continuous Sai Morel MD        dexAMETHasone (DECADRON) tablet 6 mg  6 mg Oral Daily Sai Morel MD        [START ON 4/11/2024] baricitinib (OLUMIANT) tablet 4 mg  4 mg Oral Daily Sai Morel MD         Current Outpatient Medications   Medication Sig Dispense Refill    atorvastatin (LIPITOR) 40 MG tablet Take 1 tablet by mouth daily      capecitabine (XELODA) 500 MG chemo tablet Take 4 tablets by mouth 2 times daily      diclofenac sodium (VOLTAREN) 1 % GEL Apply 2 g topically 4 times daily as needed for Pain      hydrALAZINE (APRESOLINE) 25 MG tablet Take 1 tablet by mouth 2 times daily      latanoprost (XALATAN) 0.005 % ophthalmic solution Place 1 drop into both eyes nightly      propranolol (INDERAL LA) 60 MG extended release capsule Take 1 capsule by mouth 2 times daily      ADVAIR DISKUS 100-50 MCG/ACT AEPB diskus inhaler Inhale 1 puff into the lungs 2 times daily      hydroCHLOROthiazide 12.5 MG tablet Take 1 tablet by mouth daily for blood pressure      losartan (COZAAR) 50 MG tablet Take 1 tablet by mouth daily

## 2024-04-11 NOTE — ED NOTES
ED TO INPATIENT SBAR HANDOFF    Patient Name: Taryn Corona   Preferred Name: Taryn  : 1945  79 y.o.   Family/Caregiver Present: no   Code Status Order: Full Code  PO Status: NPO:No  Telemetry Order:   C-SSRS: Risk of Suicide: No Risk  Sitter no   Restraints:     Sepsis Risk Score Sepsis Risk Score: 6.7    Situation  Chief Complaint   Patient presents with    Shortness of Breath    Positive For Covid-19     Brief Description of Patient's Condition: Pt was dx with COVID and pneumonia last Friday, cough has been persistent, O2 sats at home 73-75%, upon arrival in triage 87-94% on RA. Hx of colon cancer spread to lymph nodes.   Mental Status: oriented and alert  Arrived from:Home  Imaging:   CTA CHEST W WO CONTRAST   Final Result     Left lower lobe atelectasis is noted.  Diffuse subpleural fibrosis and ground   glass opacities are noted. No evidence of pulmonary embolism. 19 mm nodule in   the left breast, please correlate with any recent mammography.         XR CHEST PORTABLE   Final Result   1. Airspace disease in the right mid and lower lung and left lower lung, remains   suspicious for multifocal pneumonia.           Abnormal labs:   Abnormal Labs Reviewed   CBC WITH AUTO DIFFERENTIAL - Abnormal; Notable for the following components:       Result Value    RBC 3.68 (*)     Hemoglobin 10.2 (*)     Hematocrit 31.7 (*)     RDW 14.8 (*)     All other components within normal limits   COMPREHENSIVE METABOLIC PANEL - Abnormal; Notable for the following components:    Sodium 135 (*)     Potassium 2.8 (*)     Glucose 106 (*)     BUN 5 (*)     Bun/Cre Ratio 7 (*)     AST 51 (*)     Total Protein 5.9 (*)     Albumin 3.0 (*)     Albumin/Globulin Ratio 1.0 (*)     All other components within normal limits   PROCALCITONIN - Abnormal; Notable for the following components:    Procalcitonin 0.07 (*)     All other components within normal limits   URINALYSIS WITH REFLEX TO CULTURE - Abnormal; Notable for the following  2,000 mg in sodium chloride 0.9 % 500 mL IVPB (0 mg IntraVENous Stopped 4/10/24 2314)   ipratropium 0.5 mg-albuterol 2.5 mg (DUONEB) nebulizer solution 1 Dose (1 Dose Inhalation Given 4/10/24 2031)   ipratropium 0.5 mg-albuterol 2.5 mg (DUONEB) nebulizer solution 1 Dose (1 Dose Inhalation Given 4/10/24 1921)   methylPREDNISolone sodium succ (SOLU-MEDROL) 125 mg in sterile water 2 mL injection (125 mg IntraVENous Given 4/10/24 1914)   sodium chloride 0.9 % bolus 500 mL (0 mLs IntraVENous Stopped 4/10/24 2148)   iopamidol (ISOVUE-370) 76 % injection 100 mL (100 mLs IntraVENous Given 4/10/24 2128)   potassium bicarb-citric acid (EFFER-K) effervescent tablet 40 mEq (40 mEq Oral Given 4/11/24 0052)     Last documented pain medication administration: N/A  Pertinent or High Risk Medications/Drips: no   If Yes, please provide details: NS @75ml/hr continuous  Blood Product Administration: no  If Yes, please provide details: n/a  Process Protocols/Bundles: N/A    Recommendation  Incomplete STAT orders: n/a  Overdue Medications: see mar   Patient Belongings:    Additional Comments: COVID positive.   If any further questions, please call Sending RN at [phone]      Admitting Unit Notification  Name of person notified and time: 0258 MARTIN Hazel      Electronically signed by: Electronically signed by Rose Evans RN on 4/11/2024 at 2:53 AM

## 2024-04-11 NOTE — PROGRESS NOTES
Baricitinib Criteria Evaluation  Taryn Corona is a 79 y.o. female with COVID-19. Pharmacy was consulted by Maria Teresa Byers to assess whether patient meets [facility] criteria to initiate baricitinib.     Inclusion criteria (all answers must be Yes)  Proven COVID-19 (documented positive test result): Yes  Requiring supplemental oxygen therapy: Yes    @FLOWbshsiamb(6236)@  Elevated inflammatory markers (ANY elevation in CRP, D-dimer, LDH, or ferritin): Yes      Recent Labs     04/11/24  0605   CRP 14.50*      Concomitant therapy with dexamethasone 6-20 mg IV/PO daily (or equivalent steroid): Yes    Exclusion criteria (all answers must be No)  Received tocilizumab: No  eGFR < 15 mL/min or on dialysis: No    Weigh risks/benefits before initiating therapy in patients with the following:  Pregnancy  Severe hepatic impairment  Chronic/recurrent invasive infections  History of/current thrombosis  Use with other biologic DMARDs    Drug interactions:  Recommending decreasing dose by 50% if on concomitant strong OAT3 inhibitors (e.g. probenecid, benzylpenicillin)    Assessment/Plan:   Start baricitinib 4mg once daily x 14 days

## 2024-04-11 NOTE — ED NOTES
Pt had a coughing spell and O2 sats dropped to 89% on 3L. This RN increased pts O2 to 5L and notified ED MD, pt's O2 sat increased to 93%.

## 2024-04-12 LAB
ANION GAP SERPL CALC-SCNC: 7 MMOL/L (ref 5–15)
BACTERIA SPEC CULT: NORMAL
BACTERIA SPEC CULT: NORMAL
BASOPHILS # BLD: 0 K/UL (ref 0–0.1)
BASOPHILS NFR BLD: 0 % (ref 0–1)
BUN SERPL-MCNC: 7 MG/DL (ref 6–20)
BUN/CREAT SERPL: 12 (ref 12–20)
CA-I BLD-MCNC: 8.9 MG/DL (ref 8.5–10.1)
CHLORIDE SERPL-SCNC: 106 MMOL/L (ref 97–108)
CO2 SERPL-SCNC: 23 MMOL/L (ref 21–32)
CREAT SERPL-MCNC: 0.58 MG/DL (ref 0.55–1.02)
CRP SERPL-MCNC: 5.46 MG/DL (ref 0–0.3)
DIFFERENTIAL METHOD BLD: ABNORMAL
EOSINOPHIL # BLD: 0 K/UL (ref 0–0.4)
EOSINOPHIL NFR BLD: 0 % (ref 0–7)
ERYTHROCYTE [DISTWIDTH] IN BLOOD BY AUTOMATED COUNT: 15.2 % (ref 11.5–14.5)
GLUCOSE BLD STRIP.AUTO-MCNC: 172 MG/DL (ref 65–100)
GLUCOSE SERPL-MCNC: 140 MG/DL (ref 65–100)
HCT VFR BLD AUTO: 30.6 % (ref 35–47)
HGB BLD-MCNC: 10.1 G/DL (ref 11.5–16)
IMM GRANULOCYTES # BLD AUTO: 0 K/UL
IMM GRANULOCYTES NFR BLD AUTO: 0 %
LYMPHOCYTES # BLD: 1.4 K/UL (ref 0.8–3.5)
LYMPHOCYTES NFR BLD: 14 % (ref 12–49)
Lab: NORMAL
Lab: NORMAL
MCH RBC QN AUTO: 28.1 PG (ref 26–34)
MCHC RBC AUTO-ENTMCNC: 33 G/DL (ref 30–36.5)
MCV RBC AUTO: 85 FL (ref 80–99)
MONOCYTES # BLD: 0.7 K/UL (ref 0–1)
MONOCYTES NFR BLD: 7 % (ref 5–13)
NEUTS BAND NFR BLD MANUAL: 5 % (ref 0–6)
NEUTS SEG # BLD: 8 K/UL (ref 1.8–8)
NEUTS SEG NFR BLD: 74 % (ref 32–75)
NRBC # BLD: 0.02 K/UL (ref 0–0.01)
NRBC BLD-RTO: 0.2 PER 100 WBC
PERFORMED BY:: ABNORMAL
PLATELET # BLD AUTO: 306 K/UL (ref 150–400)
PLATELET COMMENT: ABNORMAL
PMV BLD AUTO: 10.6 FL (ref 8.9–12.9)
POTASSIUM SERPL-SCNC: 2.9 MMOL/L (ref 3.5–5.1)
RBC # BLD AUTO: 3.6 M/UL (ref 3.8–5.2)
RBC MORPH BLD: ABNORMAL
SODIUM SERPL-SCNC: 136 MMOL/L (ref 136–145)
WBC # BLD AUTO: 10.1 K/UL (ref 3.6–11)

## 2024-04-12 PROCEDURE — 82962 GLUCOSE BLOOD TEST: CPT

## 2024-04-12 PROCEDURE — 2580000003 HC RX 258: Performed by: INTERNAL MEDICINE

## 2024-04-12 PROCEDURE — 2580000003 HC RX 258: Performed by: HOSPITALIST

## 2024-04-12 PROCEDURE — 6360000002 HC RX W HCPCS: Performed by: HOSPITALIST

## 2024-04-12 PROCEDURE — 6360000002 HC RX W HCPCS: Performed by: INTERNAL MEDICINE

## 2024-04-12 PROCEDURE — 80048 BASIC METABOLIC PNL TOTAL CA: CPT

## 2024-04-12 PROCEDURE — 6370000000 HC RX 637 (ALT 250 FOR IP): Performed by: HOSPITALIST

## 2024-04-12 PROCEDURE — 94640 AIRWAY INHALATION TREATMENT: CPT

## 2024-04-12 PROCEDURE — 1100000000 HC RM PRIVATE

## 2024-04-12 PROCEDURE — 86140 C-REACTIVE PROTEIN: CPT

## 2024-04-12 PROCEDURE — 85025 COMPLETE CBC W/AUTO DIFF WBC: CPT

## 2024-04-12 PROCEDURE — 36415 COLL VENOUS BLD VENIPUNCTURE: CPT

## 2024-04-12 PROCEDURE — 6370000000 HC RX 637 (ALT 250 FOR IP)

## 2024-04-12 RX ORDER — CETIRIZINE HYDROCHLORIDE 10 MG/1
5 TABLET ORAL NIGHTLY
Status: DISCONTINUED | OUTPATIENT
Start: 2024-04-12 | End: 2024-04-17 | Stop reason: HOSPADM

## 2024-04-12 RX ADMIN — SODIUM CHLORIDE, PRESERVATIVE FREE 10 ML: 5 INJECTION INTRAVENOUS at 20:56

## 2024-04-12 RX ADMIN — ATORVASTATIN CALCIUM 40 MG: 40 TABLET, FILM COATED ORAL at 20:56

## 2024-04-12 RX ADMIN — DOXYCYCLINE HYCLATE 100 MG: 100 CAPSULE ORAL at 12:03

## 2024-04-12 RX ADMIN — LEVOTHYROXINE SODIUM 75 MCG: 0.03 TABLET ORAL at 10:48

## 2024-04-12 RX ADMIN — SERTRALINE HYDROCHLORIDE 50 MG: 50 TABLET ORAL at 10:47

## 2024-04-12 RX ADMIN — FAMOTIDINE 20 MG: 20 TABLET, FILM COATED ORAL at 10:47

## 2024-04-12 RX ADMIN — AMLODIPINE BESYLATE 5 MG: 5 TABLET ORAL at 10:50

## 2024-04-12 RX ADMIN — HYDRALAZINE HYDROCHLORIDE 25 MG: 25 TABLET ORAL at 20:56

## 2024-04-12 RX ADMIN — Medication 2 PUFF: at 20:09

## 2024-04-12 RX ADMIN — DEXTROMETHORPHAN HYDROBROMIDE, GUAIFENESIN 10 ML: 10; 100 LIQUID ORAL at 17:50

## 2024-04-12 RX ADMIN — LATANOPROST 1 DROP: 50 SOLUTION OPHTHALMIC at 20:57

## 2024-04-12 RX ADMIN — METHYLPREDNISOLONE SODIUM SUCCINATE 60 MG: 125 INJECTION INTRAMUSCULAR; INTRAVENOUS at 02:12

## 2024-04-12 RX ADMIN — DOXYCYCLINE HYCLATE 100 MG: 100 CAPSULE ORAL at 22:32

## 2024-04-12 RX ADMIN — METHYLPREDNISOLONE SODIUM SUCCINATE 60 MG: 125 INJECTION INTRAMUSCULAR; INTRAVENOUS at 17:35

## 2024-04-12 RX ADMIN — BARICITINIB 4 MG: 2 TABLET, FILM COATED ORAL at 10:49

## 2024-04-12 RX ADMIN — FAMOTIDINE 20 MG: 20 TABLET, FILM COATED ORAL at 20:56

## 2024-04-12 RX ADMIN — LOPERAMIDE HYDROCHLORIDE 2 MG: 2 CAPSULE ORAL at 17:50

## 2024-04-12 RX ADMIN — CETIRIZINE HYDROCHLORIDE 5 MG: 10 TABLET, FILM COATED ORAL at 21:01

## 2024-04-12 RX ADMIN — LOPERAMIDE HYDROCHLORIDE 2 MG: 2 CAPSULE ORAL at 10:46

## 2024-04-12 RX ADMIN — HYDRALAZINE HYDROCHLORIDE 25 MG: 25 TABLET ORAL at 10:47

## 2024-04-12 RX ADMIN — ACETAMINOPHEN 650 MG: 325 TABLET ORAL at 17:50

## 2024-04-12 RX ADMIN — POTASSIUM BICARBONATE 40 MEQ: 782 TABLET, EFFERVESCENT ORAL at 10:49

## 2024-04-12 RX ADMIN — ENOXAPARIN SODIUM 40 MG: 100 INJECTION SUBCUTANEOUS at 10:51

## 2024-04-12 RX ADMIN — POTASSIUM BICARBONATE 40 MEQ: 782 TABLET, EFFERVESCENT ORAL at 20:56

## 2024-04-12 RX ADMIN — METHYLPREDNISOLONE SODIUM SUCCINATE 60 MG: 125 INJECTION INTRAMUSCULAR; INTRAVENOUS at 10:49

## 2024-04-12 RX ADMIN — LOSARTAN POTASSIUM 50 MG: 50 TABLET, FILM COATED ORAL at 10:46

## 2024-04-12 RX ADMIN — DEXTROMETHORPHAN HYDROBROMIDE, GUAIFENESIN 10 ML: 10; 100 LIQUID ORAL at 10:49

## 2024-04-12 ASSESSMENT — PAIN DESCRIPTION - LOCATION
LOCATION: GENERALIZED
LOCATION: GENERALIZED

## 2024-04-12 ASSESSMENT — PAIN SCALES - GENERAL
PAINLEVEL_OUTOF10: 2
PAINLEVEL_OUTOF10: 3

## 2024-04-12 ASSESSMENT — PAIN DESCRIPTION - DESCRIPTORS: DESCRIPTORS: ACHING

## 2024-04-12 NOTE — PROGRESS NOTES
Hematology/Oncology   Progress Note    Patient: Taryn Corona MRN: [id number]     YOB: 1945  Age: 79 y.o.  Sex: female      Admit Date: 4/10/2024    LOS: 2 days     Chief Complaint: SOB, cough  HemOnc: colon cancer  Subjective:     Patient lying in bed, NAD. Daughter present at the bedside    Review of Systems:  Constitutional No fevers, chills, night sweats, excessive fatigue or weight loss.   ENMT No problems with hearing, no sore throat, no sinus drainage.   Hematologic/Lymphatic No easy bruising or bleeding.   Respiratory Cough +, SOB +   Cardiovascular No anginal chest pain, tachycardia, palpitations.   Gastrointestinal No nausea, vomiting, GI bleeding   Genitourinary (M) No hematuria, dysuria   Musculoskeletal No joint pain, swelling or redness.   Neurologic No headache, blurred vision     Current Facility-Administered Medications:     potassium bicarb-citric acid (EFFER-K) effervescent tablet 40 mEq, 40 mEq, Oral, BID, Sweetie Sorto PA-C, 40 mEq at 04/12/24 1049    cetirizine (ZYRTEC) tablet 5 mg, 5 mg, Oral, Nightly, Sweetie Sorto PA-C    doxycycline hyclate (VIBRAMYCIN) capsule 100 mg, 100 mg, Oral, BID, Sai Morel MD, 100 mg at 04/12/24 1203    famotidine (PEPCID) tablet 20 mg, 20 mg, Oral, BID, Sweetie Sorto PA-C, 20 mg at 04/12/24 1047    guaiFENesin-dextromethorphan (ROBITUSSIN DM) 100-10 MG/5ML syrup 10 mL, 10 mL, Oral, Q4H PRN, Sweetie Sorto PA-C, 10 mL at 04/12/24 1049    loperamide (IMODIUM) capsule 2 mg, 2 mg, Oral, 4x Daily PRN, Sweetie Sorto PA-C, 2 mg at 04/12/24 1046    ipratropium 0.5 mg-albuterol 2.5 mg (DUONEB) nebulizer solution 1 Dose, 1 Dose, Inhalation, Q4H PRN, Sweetie Sorto PA-C    methylPREDNISolone sodium succ (SOLU-MEDROL) 60 mg in sterile water 0.96 mL injection, 60 mg, IntraVENous, Q8H, Carlton Gallo MD, 60 mg at 04/12/24 1049    albuterol sulfate HFA (PROVENTIL;VENTOLIN;PROAIR) 108 (90 Base) MCG/ACT inhaler 2 puff, 2  tablet 6 mg, 6 mg, Oral, Daily, Sai Morel MD, 6 mg at 04/11/24 0904    baricitinib (OLUMIANT) tablet 4 mg, 4 mg, Oral, Daily, Sai Morel MD, 4 mg at 04/12/24 1049     Objective:     Vitals:    04/11/24 1945 04/11/24 2211 04/12/24 0030 04/12/24 1030   BP: (!) 155/87  (!) 160/80 (!) 156/88   Pulse: 79 70 82 78   Resp: 18 18 18 20   Temp: 98.6 °F (37 °C)  98.6 °F (37 °C) 97.7 °F (36.5 °C)   TempSrc: Oral  Oral Oral   SpO2: 90% 94% 94% 95%   Weight:       Height:          Physical Exam:  Constitutional Alert, cooperative, oriented. Mood and affect appropriate.    Head Normocephalic; no scars   Eyes Conjunctivae and sclerae are clear and without icterus. Pupils are reactive and equal.   ENMT No oral exudates, ulcers, masses, thrush or mucositis.    Neck Supple without masses or thyromegaly. No jugular venous distension.   Hematologic/Lymphatic No petechiae or purpura.   Respiratory Lungs are clear to auscultation   Cardiovascular Regular rate and rhythm of heart   Abdomen Non-tender, non-distended, no masses, ascites or hepatosplenomegaly. Good bowel sounds. No guarding or rebound tenderness.    Musculoskeletal No tenderness or swelling, normal range of motion    Extremities No visible deformities or edema.    Skin No rashes, scars, or lesions suggestive of malignancy.    Neurologic Alert and oriented times three. Coherent speech. Verbalizes understanding of our discussions today.     Lab/Data Review:  Recent Labs     04/10/24  1900 04/11/24  0605 04/12/24  0517   WBC 7.4 8.4 10.1   HGB 10.2* 10.2* 10.1*   HCT 31.7* 31.6* 30.6*    227 306     Recent Labs     04/10/24  1900 04/11/24  0605 04/12/24  0515   * 135* 136   K 2.8* 3.3* 2.9*    104 106   CO2 27 26 23   BUN 5* 6 7   ALT 21 21  --    INR  --  1.1  --      No results for input(s): \"PH\", \"PCO2\", \"PO2\", \"HCO3\", \"FIO2\" in the last 72 hours.  Recent Results (from the past 24 hour(s))   POCT Glucose    Collection Time:

## 2024-04-12 NOTE — PROGRESS NOTES
Patient went to bathroom and back to bed, oxygen sats dropped to 87%. On 1.5liter. oxygen increased to 4 liters and o2 sats 90% . Respiratory therapy notified and they are coming up to see patient

## 2024-04-12 NOTE — PROGRESS NOTES
Hospitalist Progress Note    NAME:   Taryn Corona   : 1945   MRN: [id number]     Date/Time: 2024 11:42 AM  Patient PCP: Afsaneh Alcaraz MD    Estimated discharge date: 48 hours  Barriers: Wean O2    Hospital Course:  Taryn Corona is a 79-year-old female with hypertension, asthma, hyperlipidemia, hypothyroidism, colon cancer with metastasis on chemotherapy admitted 4/10/2024 for cough and shortness of breath.  Patient hypoxic and requiring 5 L via nasal cannula, weaned to 1.5 L via nasal cannula.  Patient tested positive for COVID at home.  CTA chest shows left lower lobe atelectasis, diffuse subpleural fibrosis with groundglass opacities.  Pulmonary embolism ruled out.  Continue DuoNebs.  Continue oral steroids and baricitinib.  Oncology following.    Assessment / Plan:  Acute respiratory failure with hypoxia  COVID-19, pneumonitis  - Continue supplementation with oxygen, titrating SpO2 90 to 95%.  Currently requiring 1.5 L via nasal cannula  - Continue oral steroid and baricitinib  - Continue DuoNebs    Chronic obstructive asthma  -Continue Symbicort (substituted for Advair)  - Continue DuoNebs    Hypokalemia  - 2.8> 3.3>2.9, continue supplementation and continue to monitor    Mild normocytic anemia  - Hemoglobin 10.1, will continue monitor  Hypertension  -Continue amlodipine, hydralazine, losartan    Hypothyroidism  - Continue levothyroxine    Carcinoma of the colon with metastasis  -follows with [facility];  -She completed 3 cycles of CapeOx after which the regimen was discontinued due to persistent/severe diarrhea  -Started on FOLFOX on 2024 which she is tolerating better  -Last treatment was on 3/19/2024.  Treatment was held on 2024 due to neutropenia  -Scheduled for next treatment on 2024 which may need to be delayed further.  -CT report shows 19 mm nodule in the left breast, biopsy was done 2023 showing fragments of papilloma with usual ductal hyperplasia.  Will need  time.          Reviewed most current lab test results and cultures  YES  Reviewed most current radiology test results   YES  Review and summation of old records today    NO  Reviewed patient's current orders and MAR    YES  PMH/SH reviewed - no change compared to H&P  ________________________________________________________________________  Care Plan discussed with:    Comments   Patient x    Family      RN x    Care Manager     Consultant                        Multidiciplinary team rounds were held today with , nursing, pharmacist and clinical coordinator.  Patient's plan of care was discussed; medications were reviewed and discharge planning was addressed.     ________________________________________________________________________  Total NON critical care TIME:  35  Minutes    Total CRITICAL CARE TIME Spent:   Minutes non procedure based      Comments   >50% of visit spent in counseling and coordination of care     ________________________________________________________________________  Sweetie Sorto PA-C     Procedures: see electronic medical records for all procedures/Xrays and details which were not copied into this note but were reviewed prior to creation of Plan.      LABS:  I reviewed today's most current labs and imaging studies.  Pertinent labs include:  Recent Labs     04/10/24  1900 04/11/24  0605 04/12/24  0517   WBC 7.4 8.4 10.1   HGB 10.2* 10.2* 10.1*   HCT 31.7* 31.6* 30.6*    227 306       Recent Labs     04/10/24  1900 04/11/24  0605 04/12/24  0515   * 135* 136   K 2.8* 3.3* 2.9*    104 106   CO2 27 26 23   BUN 5* 6 7   ALT 21 21  --    INR  --  1.1  --          Signed: Sweetie Sorto PA-C

## 2024-04-12 NOTE — CARE COORDINATION
0705: Chart reviewed.    Per notes; patient on supplemental oxygen followed via oncology.    Current Dispo: Home with Family.    CM will continue to follow patient and recs of medical team.

## 2024-04-13 LAB
ANION GAP SERPL CALC-SCNC: 6 MMOL/L (ref 5–15)
BASOPHILS # BLD: 0 K/UL (ref 0–0.1)
BASOPHILS NFR BLD: 0 % (ref 0–1)
BUN SERPL-MCNC: 12 MG/DL (ref 6–20)
BUN/CREAT SERPL: 19 (ref 12–20)
CA-I BLD-MCNC: 8.8 MG/DL (ref 8.5–10.1)
CHLORIDE SERPL-SCNC: 105 MMOL/L (ref 97–108)
CO2 SERPL-SCNC: 26 MMOL/L (ref 21–32)
CREAT SERPL-MCNC: 0.63 MG/DL (ref 0.55–1.02)
DIFFERENTIAL METHOD BLD: ABNORMAL
EOSINOPHIL # BLD: 0 K/UL (ref 0–0.4)
EOSINOPHIL NFR BLD: 0 % (ref 0–7)
ERYTHROCYTE [DISTWIDTH] IN BLOOD BY AUTOMATED COUNT: 15 % (ref 11.5–14.5)
GLUCOSE SERPL-MCNC: 138 MG/DL (ref 65–100)
HCT VFR BLD AUTO: 28.7 % (ref 35–47)
HGB BLD-MCNC: 9.5 G/DL (ref 11.5–16)
IMM GRANULOCYTES # BLD AUTO: 0.9 K/UL (ref 0–0.04)
IMM GRANULOCYTES NFR BLD AUTO: 6 % (ref 0–0.5)
LYMPHOCYTES # BLD: 0.9 K/UL (ref 0.8–3.5)
LYMPHOCYTES NFR BLD: 7 % (ref 12–49)
MCH RBC QN AUTO: 27.9 PG (ref 26–34)
MCHC RBC AUTO-ENTMCNC: 33.1 G/DL (ref 30–36.5)
MCV RBC AUTO: 84.2 FL (ref 80–99)
MONOCYTES # BLD: 0.8 K/UL (ref 0–1)
MONOCYTES NFR BLD: 6 % (ref 5–13)
NEUTS SEG # BLD: 11.1 K/UL (ref 1.8–8)
NEUTS SEG NFR BLD: 81 % (ref 32–75)
NRBC # BLD: 0 K/UL (ref 0–0.01)
NRBC BLD-RTO: 0 PER 100 WBC
PLATELET # BLD AUTO: 283 K/UL (ref 150–400)
PMV BLD AUTO: 10.1 FL (ref 8.9–12.9)
POTASSIUM SERPL-SCNC: 3.4 MMOL/L (ref 3.5–5.1)
RBC # BLD AUTO: 3.41 M/UL (ref 3.8–5.2)
SODIUM SERPL-SCNC: 137 MMOL/L (ref 136–145)
WBC # BLD AUTO: 13.7 K/UL (ref 3.6–11)

## 2024-04-13 PROCEDURE — 85025 COMPLETE CBC W/AUTO DIFF WBC: CPT

## 2024-04-13 PROCEDURE — 6370000000 HC RX 637 (ALT 250 FOR IP): Performed by: INTERNAL MEDICINE

## 2024-04-13 PROCEDURE — 1100000000 HC RM PRIVATE

## 2024-04-13 PROCEDURE — 80048 BASIC METABOLIC PNL TOTAL CA: CPT

## 2024-04-13 PROCEDURE — 94640 AIRWAY INHALATION TREATMENT: CPT

## 2024-04-13 PROCEDURE — 6370000000 HC RX 637 (ALT 250 FOR IP)

## 2024-04-13 PROCEDURE — 6360000002 HC RX W HCPCS: Performed by: HOSPITALIST

## 2024-04-13 PROCEDURE — 2700000000 HC OXYGEN THERAPY PER DAY

## 2024-04-13 PROCEDURE — 6370000000 HC RX 637 (ALT 250 FOR IP): Performed by: HOSPITALIST

## 2024-04-13 PROCEDURE — 36415 COLL VENOUS BLD VENIPUNCTURE: CPT

## 2024-04-13 PROCEDURE — 6360000002 HC RX W HCPCS: Performed by: INTERNAL MEDICINE

## 2024-04-13 PROCEDURE — 2580000003 HC RX 258: Performed by: HOSPITALIST

## 2024-04-13 PROCEDURE — 2580000003 HC RX 258: Performed by: INTERNAL MEDICINE

## 2024-04-13 PROCEDURE — 94761 N-INVAS EAR/PLS OXIMETRY MLT: CPT

## 2024-04-13 RX ORDER — CODEINE PHOSPHATE AND GUAIFENESIN 10; 100 MG/5ML; MG/5ML
5 SOLUTION ORAL
Status: DISCONTINUED | OUTPATIENT
Start: 2024-04-13 | End: 2024-04-17 | Stop reason: HOSPADM

## 2024-04-13 RX ADMIN — LATANOPROST 1 DROP: 50 SOLUTION OPHTHALMIC at 20:52

## 2024-04-13 RX ADMIN — METHYLPREDNISOLONE SODIUM SUCCINATE 60 MG: 125 INJECTION INTRAMUSCULAR; INTRAVENOUS at 17:34

## 2024-04-13 RX ADMIN — FAMOTIDINE 20 MG: 20 TABLET, FILM COATED ORAL at 20:41

## 2024-04-13 RX ADMIN — AMLODIPINE BESYLATE 5 MG: 5 TABLET ORAL at 08:30

## 2024-04-13 RX ADMIN — BARICITINIB 4 MG: 2 TABLET, FILM COATED ORAL at 08:33

## 2024-04-13 RX ADMIN — LOSARTAN POTASSIUM 50 MG: 50 TABLET, FILM COATED ORAL at 08:31

## 2024-04-13 RX ADMIN — CETIRIZINE HYDROCHLORIDE 5 MG: 10 TABLET, FILM COATED ORAL at 20:42

## 2024-04-13 RX ADMIN — SODIUM CHLORIDE, PRESERVATIVE FREE 10 ML: 5 INJECTION INTRAVENOUS at 08:34

## 2024-04-13 RX ADMIN — SERTRALINE HYDROCHLORIDE 50 MG: 50 TABLET ORAL at 08:31

## 2024-04-13 RX ADMIN — DEXTROMETHORPHAN HYDROBROMIDE, GUAIFENESIN 10 ML: 10; 100 LIQUID ORAL at 16:43

## 2024-04-13 RX ADMIN — Medication 2 PUFF: at 19:11

## 2024-04-13 RX ADMIN — DEXTROMETHORPHAN HYDROBROMIDE, GUAIFENESIN 10 ML: 10; 100 LIQUID ORAL at 08:31

## 2024-04-13 RX ADMIN — METHYLPREDNISOLONE SODIUM SUCCINATE 60 MG: 125 INJECTION INTRAMUSCULAR; INTRAVENOUS at 02:30

## 2024-04-13 RX ADMIN — DOXYCYCLINE HYCLATE 100 MG: 100 CAPSULE ORAL at 12:01

## 2024-04-13 RX ADMIN — GUAIFENESIN AND CODEINE PHOSPHATE 5 ML: 10; 100 LIQUID ORAL at 23:17

## 2024-04-13 RX ADMIN — METHYLPREDNISOLONE SODIUM SUCCINATE 60 MG: 125 INJECTION INTRAMUSCULAR; INTRAVENOUS at 10:28

## 2024-04-13 RX ADMIN — DOXYCYCLINE HYCLATE 100 MG: 100 CAPSULE ORAL at 23:17

## 2024-04-13 RX ADMIN — HYDRALAZINE HYDROCHLORIDE 25 MG: 25 TABLET ORAL at 08:31

## 2024-04-13 RX ADMIN — SODIUM CHLORIDE, PRESERVATIVE FREE 10 ML: 5 INJECTION INTRAVENOUS at 20:44

## 2024-04-13 RX ADMIN — Medication 2 PUFF: at 07:42

## 2024-04-13 RX ADMIN — LEVOTHYROXINE SODIUM 75 MCG: 0.03 TABLET ORAL at 07:01

## 2024-04-13 RX ADMIN — HYDRALAZINE HYDROCHLORIDE 25 MG: 25 TABLET ORAL at 20:41

## 2024-04-13 RX ADMIN — POTASSIUM BICARBONATE 40 MEQ: 782 TABLET, EFFERVESCENT ORAL at 08:31

## 2024-04-13 RX ADMIN — FAMOTIDINE 20 MG: 20 TABLET, FILM COATED ORAL at 08:31

## 2024-04-13 RX ADMIN — ENOXAPARIN SODIUM 40 MG: 100 INJECTION SUBCUTANEOUS at 08:31

## 2024-04-13 NOTE — PLAN OF CARE
Problem: Discharge Planning  Goal: Discharge to home or other facility with appropriate resources  Outcome: Progressing     Problem: Pain  Goal: Verbalizes/displays adequate comfort level or baseline comfort level  Outcome: Progressing     Problem: ABCDS Injury Assessment  Goal: Absence of physical injury  Outcome: Progressing     Problem: Safety - Adult  Goal: Free from fall injury  Outcome: Progressing     Problem: Skin/Tissue Integrity  Goal: Absence of new skin breakdown  Description: 1.  Monitor for areas of redness and/or skin breakdown  2.  Assess vascular access sites hourly  3.  Every 4-6 hours minimum:  Change oxygen saturation probe site  4.  Every 4-6 hours:  If on nasal continuous positive airway pressure, respiratory therapy assess nares and determine need for appliance change or resting period.  4/13/2024 0956 by Christopher Shaw, RN  Outcome: Progressing  4/13/2024 0956 by Christopher Shaw, RN  Outcome: Progressing     Problem: Respiratory - Adult  Goal: Achieves optimal ventilation and oxygenation  Outcome: Progressing

## 2024-04-13 NOTE — PROGRESS NOTES
Hospitalist Progress Note    NAME:   Taryn Corona   : 1945   MRN: [id number]     Date/Time: 2024 1:56 PM  Patient PCP: Afsaneh Alcaraz MD    Estimated discharge date: 48 hours  Barriers: Wean O2, IV steroids, Pulm consult    Hospital Course:  Taryn Corona is a 79-year-old female with hypertension, asthma, hyperlipidemia, hypothyroidism, colon cancer with metastasis on chemotherapy admitted 4/10/2024 for cough and shortness of breath.  Patient hypoxic and requiring 5 L via nasal cannula, attempted to wean but patient is again requiring 5L.  Patient tested positive for COVID at home. Pt did not receive any COVID vaccines. CTA chest shows left lower lobe atelectasis, diffuse subpleural fibrosis with groundglass opacities.  Pulmonary embolism ruled out.  Continue DuoNebs.  Continue IV steroids and baricitinib.  Pulmonology consulted. oncology following.    Assessment / Plan:  Acute respiratory failure with hypoxia  COVID-19, pneumonitis  Leukocytosis  - Continue supplementation with oxygen, titrating SpO2 90 to 95%.  Had weaned to 1.5L but is back to Currently requiring 5 L via nasal cannula.  Patient states she gets very out of breath and fatigued with activity  - Continue IV steroid and baricitinib  - Continue DuoNebs  - Pulmonary consult placed    Chronic obstructive asthma  -Continue Symbicort (substituted for Advair)  - Continue DuoNebs    Hypokalemia  - 2.8> 3.3>2.9>3.4, continue supplementation and continue to monitor    Mild normocytic anemia  - Hemoglobin 10.1, will continue monitor    Hypertension  -Continue amlodipine, hydralazine, losartan    Hypothyroidism  - Continue levothyroxine    Carcinoma of the colon with metastasis  -follows with [facility];  -She completed 3 cycles of CapeOx after which the regimen was discontinued due to persistent/severe diarrhea  -Started on FOLFOX on 2024 which she is tolerating better  -Last treatment was on 3/19/2024.  Treatment was held on 2024  12   ALT 21 21  --   --    INR  --  1.1  --   --          Signed: Sweetie Sorto PA-C    Nausea and vomiting that does not stop/Pain not relieved by Medications/Swelling that gets worse/Bleeding that does not stop/Wound/Surgical Site with redness, or foul smelling discharge or pus/Fever greater than (need to indicate Fahrenheit or Celsius)/Inability to tolerate liquids or foods

## 2024-04-13 NOTE — CONSULTS
Pulmonary/ CC Consult    Subjective:   Date of Consultation:  2024  Referring Physician: Sai Morel MD     Thank you for this consultation  Ms. Taryn Corona is a 79 years old female who is known to have history of hyperlipidemia, hypothyroidism, hypertension.  She was diagnosed with colon cancer with metastasis who has been on chemotherapy.  She presents to the emergency department because of symptoms of worsening cough along with shortness of breath.  Cough was aggressive mostly dry in nature resulting in feeling of shortness of breath.  She started having chest muscle pains because of her persistent coughing.  In ER she was found to be afebrile.  She was found to be hypoxic with oxygen saturation 75%.  She was placed on 6 L of nasal cannula oxygen.  When I saw her she was saturating 97%, supplemental oxygen was cut down to 2 L/min.  Patient claims that she has history of asthma but was not using her inhalers on regular basis.  In the ER she was tested positive for COVID-19 pneumonia.  CT scan of chest was done which showed left lower lobe atelectasis along with diffuse subpleural fibrosis with groundglass opacities.  No filling defects noted to suggest any pulmonary embolism.      Patient Active Problem List   Diagnosis    Acquired hypothyroidism    Anxiety    Essential hypertension    Obesity, morbid (HCC)    Multinodular goiter (nontoxic)    Dyspnea    Asthma exacerbation    Mild obstructive sleep apnea    Pneumonia due to COVID-19 virus    Acute respiratory failure with hypoxia (HCC)     Past Medical History:   Diagnosis Date    Acid reflux     Asthma     Cancer (HCC)     Hypercholesteremia     Hypertension     Hypothyroidism       History reviewed. No pertinent family history.   Social History     Tobacco Use    Smoking status: Former     Current packs/day: 0.00     Types: Cigarettes     Quit date: 2013     Years since quittin.2     Passive exposure: Never    Smokeless tobacco: 
Urine Negative Negative mg/dL    Bilirubin Urine Negative Negative      Blood, Urine Negative Negative      Urobilinogen, Urine 0.1 0.1 - 1.0 EU/dL    Nitrite, Urine Negative Negative      Leukocyte Esterase, Urine Negative Negative      WBC, UA 0-4 0 - 4 /hpf    RBC, UA 0-5 0 - 5 /hpf    Epithelial Cells UA Few Few /lpf    BACTERIA, URINE 1+ (A) Negative /hpf    Urine Culture if Indicated Culture not indicated by UA result Culture not indicated by UA result     Brain Natriuretic Peptide    Collection Time: 04/10/24  7:00 PM   Result Value Ref Range    NT Pro- <450 pg/mL   MRSA by PCR    Collection Time: 04/10/24  7:00 PM    Specimen: Swab   Result Value Ref Range    MRSA by PCR Not Detected Not Detected     C-Reactive Protein    Collection Time: 04/11/24 12:19 AM   Result Value Ref Range    CRP 12.50 (H) 0.00 - 0.30 mg/dL   CBC with Auto Differential    Collection Time: 04/11/24  6:05 AM   Result Value Ref Range    WBC 8.4 3.6 - 11.0 K/uL    RBC 3.68 (L) 3.80 - 5.20 M/uL    Hemoglobin 10.2 (L) 11.5 - 16.0 g/dL    Hematocrit 31.6 (L) 35.0 - 47.0 %    MCV 85.9 80.0 - 99.0 FL    MCH 27.7 26.0 - 34.0 PG    MCHC 32.3 30.0 - 36.5 g/dL    RDW 15.0 (H) 11.5 - 14.5 %    Platelets 227 150 - 400 K/uL    MPV 9.5 8.9 - 12.9 FL    Nucleated RBCs 0.0 0.0  WBC    nRBC 0.00 0.00 - 0.01 K/uL    Neutrophils % 89 (H) 32 - 75 %    Lymphocytes % 9 (L) 12 - 49 %    Monocytes % 2 (L) 5 - 13 %    Eosinophils % 0 0 - 7 %    Basophils % 0 0 - 1 %    Immature Granulocytes % 0 %    Neutrophils Absolute 7.4 1.8 - 8.0 K/UL    Lymphocytes Absolute 0.8 0.8 - 3.5 K/UL    Monocytes Absolute 0.2 0.0 - 1.0 K/UL    Eosinophils Absolute 0.0 0.0 - 0.4 K/UL    Basophils Absolute 0.0 0.0 - 0.1 K/UL    Immature Granulocytes Absolute 0.0 K/UL    Differential Type Manual      RBC Comment Ovalocytes  1+       Comprehensive Metabolic Panel w/ Reflex to MG    Collection Time: 04/11/24  6:05 AM   Result Value Ref Range    Sodium 135 (L) 136 - 145

## 2024-04-13 NOTE — PROGRESS NOTES
13.7 (H) 3.6 - 11.0 K/uL    RBC 3.41 (L) 3.80 - 5.20 M/uL    Hemoglobin 9.5 (L) 11.5 - 16.0 g/dL    Hematocrit 28.7 (L) 35.0 - 47.0 %    MCV 84.2 80.0 - 99.0 FL    MCH 27.9 26.0 - 34.0 PG    MCHC 33.1 30.0 - 36.5 g/dL    RDW 15.0 (H) 11.5 - 14.5 %    Platelets 283 150 - 400 K/uL    MPV 10.1 8.9 - 12.9 FL    Nucleated RBCs 0.0 0.0  WBC    nRBC 0.00 0.00 - 0.01 K/uL    Neutrophils % 81 (H) 32 - 75 %    Lymphocytes % 7 (L) 12 - 49 %    Monocytes % 6 5 - 13 %    Eosinophils % 0 0 - 7 %    Basophils % 0 0 - 1 %    Immature Granulocytes % 6 (H) 0 - 0.5 %    Neutrophils Absolute 11.1 (H) 1.8 - 8.0 K/UL    Lymphocytes Absolute 0.9 0.8 - 3.5 K/UL    Monocytes Absolute 0.8 0.0 - 1.0 K/UL    Eosinophils Absolute 0.0 0.0 - 0.4 K/UL    Basophils Absolute 0.0 0.0 - 0.1 K/UL    Immature Granulocytes Absolute 0.9 (H) 0.00 - 0.04 K/UL    Differential Type AUTOMATED     Basic Metabolic Panel    Collection Time: 04/13/24  6:36 AM   Result Value Ref Range    Sodium 137 136 - 145 mmol/L    Potassium 3.4 (L) 3.5 - 5.1 mmol/L    Chloride 105 97 - 108 mmol/L    CO2 26 21 - 32 mmol/L    Anion Gap 6 5 - 15 mmol/L    Glucose 138 (H) 65 - 100 mg/dL    BUN 12 6 - 20 mg/dL    Creatinine 0.63 0.55 - 1.02 mg/dL    Bun/Cre Ratio 19 12 - 20      Est, Glom Filt Rate >90 >60 ml/min/1.73m2    Calcium 8.8 8.5 - 10.1 mg/dL      Assessment and Plan:     Stage III colon cancer:  -She completed 3 cycles of CapeOx after which the regimen was discontinued due to persistent/severe diarrhea  -Started on FOLFOX on 2/20/2024 which she is tolerating better  -Last treatment was on 3/19/2024.  Treatment was held on 4/1/2024 due to neutropenia  -Scheduled for next treatment on 4/16/2024 which may need to be delayed further.     Dyspnea/cough:  -CT chest showing diffuse groundglass opacities  -Secondary to COVID pneumonitis  -On baricitinib, supplemental oxygen and nebulizer treatment  -Continue IV steroids  -Consider pulmonology consultation     Anemia:  -Mild

## 2024-04-14 LAB
ANION GAP SERPL CALC-SCNC: 9 MMOL/L (ref 5–15)
BASOPHILS # BLD: 0.1 K/UL (ref 0–0.1)
BASOPHILS NFR BLD: 0 % (ref 0–1)
BUN SERPL-MCNC: 17 MG/DL (ref 6–20)
BUN/CREAT SERPL: 26 (ref 12–20)
CA-I BLD-MCNC: 8.9 MG/DL (ref 8.5–10.1)
CHLORIDE SERPL-SCNC: 104 MMOL/L (ref 97–108)
CO2 SERPL-SCNC: 25 MMOL/L (ref 21–32)
CREAT SERPL-MCNC: 0.65 MG/DL (ref 0.55–1.02)
DIFFERENTIAL METHOD BLD: ABNORMAL
EOSINOPHIL # BLD: 0 K/UL (ref 0–0.4)
EOSINOPHIL NFR BLD: 0 % (ref 0–7)
ERYTHROCYTE [DISTWIDTH] IN BLOOD BY AUTOMATED COUNT: 15.2 % (ref 11.5–14.5)
GLUCOSE SERPL-MCNC: 135 MG/DL (ref 65–100)
HCT VFR BLD AUTO: 28.9 % (ref 35–47)
HGB BLD-MCNC: 9.7 G/DL (ref 11.5–16)
IMM GRANULOCYTES # BLD AUTO: 1.4 K/UL (ref 0–0.04)
IMM GRANULOCYTES NFR BLD AUTO: 8 % (ref 0–0.5)
LYMPHOCYTES # BLD: 1.1 K/UL (ref 0.8–3.5)
LYMPHOCYTES NFR BLD: 6 % (ref 12–49)
MCH RBC QN AUTO: 28.3 PG (ref 26–34)
MCHC RBC AUTO-ENTMCNC: 33.6 G/DL (ref 30–36.5)
MCV RBC AUTO: 84.3 FL (ref 80–99)
MONOCYTES # BLD: 1 K/UL (ref 0–1)
MONOCYTES NFR BLD: 6 % (ref 5–13)
NEUTS SEG # BLD: 14.5 K/UL (ref 1.8–8)
NEUTS SEG NFR BLD: 80 % (ref 32–75)
NRBC # BLD: 0 K/UL (ref 0–0.01)
NRBC BLD-RTO: 0 PER 100 WBC
PLATELET # BLD AUTO: 291 K/UL (ref 150–400)
PMV BLD AUTO: 10 FL (ref 8.9–12.9)
POTASSIUM SERPL-SCNC: 3.5 MMOL/L (ref 3.5–5.1)
RBC # BLD AUTO: 3.43 M/UL (ref 3.8–5.2)
SODIUM SERPL-SCNC: 138 MMOL/L (ref 136–145)
WBC # BLD AUTO: 18 K/UL (ref 3.6–11)

## 2024-04-14 PROCEDURE — 2580000003 HC RX 258: Performed by: HOSPITALIST

## 2024-04-14 PROCEDURE — 36415 COLL VENOUS BLD VENIPUNCTURE: CPT

## 2024-04-14 PROCEDURE — 2700000000 HC OXYGEN THERAPY PER DAY

## 2024-04-14 PROCEDURE — 6370000000 HC RX 637 (ALT 250 FOR IP): Performed by: HOSPITALIST

## 2024-04-14 PROCEDURE — 85025 COMPLETE CBC W/AUTO DIFF WBC: CPT

## 2024-04-14 PROCEDURE — 6370000000 HC RX 637 (ALT 250 FOR IP): Performed by: INTERNAL MEDICINE

## 2024-04-14 PROCEDURE — 2580000003 HC RX 258: Performed by: INTERNAL MEDICINE

## 2024-04-14 PROCEDURE — 6360000002 HC RX W HCPCS: Performed by: HOSPITALIST

## 2024-04-14 PROCEDURE — 6360000002 HC RX W HCPCS: Performed by: INTERNAL MEDICINE

## 2024-04-14 PROCEDURE — 80048 BASIC METABOLIC PNL TOTAL CA: CPT

## 2024-04-14 PROCEDURE — 6370000000 HC RX 637 (ALT 250 FOR IP)

## 2024-04-14 PROCEDURE — 1100000000 HC RM PRIVATE

## 2024-04-14 PROCEDURE — 94761 N-INVAS EAR/PLS OXIMETRY MLT: CPT

## 2024-04-14 PROCEDURE — 94640 AIRWAY INHALATION TREATMENT: CPT

## 2024-04-14 RX ADMIN — METHYLPREDNISOLONE SODIUM SUCCINATE 60 MG: 125 INJECTION INTRAMUSCULAR; INTRAVENOUS at 08:32

## 2024-04-14 RX ADMIN — METHYLPREDNISOLONE SODIUM SUCCINATE 60 MG: 125 INJECTION INTRAMUSCULAR; INTRAVENOUS at 18:01

## 2024-04-14 RX ADMIN — LEVOTHYROXINE SODIUM 75 MCG: 0.03 TABLET ORAL at 06:03

## 2024-04-14 RX ADMIN — HYDRALAZINE HYDROCHLORIDE 25 MG: 25 TABLET ORAL at 08:30

## 2024-04-14 RX ADMIN — SODIUM CHLORIDE, PRESERVATIVE FREE 10 ML: 5 INJECTION INTRAVENOUS at 09:00

## 2024-04-14 RX ADMIN — GUAIFENESIN AND CODEINE PHOSPHATE 5 ML: 10; 100 LIQUID ORAL at 08:30

## 2024-04-14 RX ADMIN — DOXYCYCLINE HYCLATE 100 MG: 100 CAPSULE ORAL at 11:36

## 2024-04-14 RX ADMIN — FAMOTIDINE 20 MG: 20 TABLET, FILM COATED ORAL at 08:30

## 2024-04-14 RX ADMIN — ENOXAPARIN SODIUM 40 MG: 100 INJECTION SUBCUTANEOUS at 08:31

## 2024-04-14 RX ADMIN — GUAIFENESIN AND CODEINE PHOSPHATE 5 ML: 10; 100 LIQUID ORAL at 16:08

## 2024-04-14 RX ADMIN — METHYLPREDNISOLONE SODIUM SUCCINATE 60 MG: 125 INJECTION INTRAMUSCULAR; INTRAVENOUS at 01:33

## 2024-04-14 RX ADMIN — AMLODIPINE BESYLATE 5 MG: 5 TABLET ORAL at 08:30

## 2024-04-14 RX ADMIN — Medication 2 PUFF: at 21:46

## 2024-04-14 RX ADMIN — GUAIFENESIN AND CODEINE PHOSPHATE 5 ML: 10; 100 LIQUID ORAL at 23:57

## 2024-04-14 RX ADMIN — DOXYCYCLINE HYCLATE 100 MG: 100 CAPSULE ORAL at 23:57

## 2024-04-14 RX ADMIN — LOSARTAN POTASSIUM 50 MG: 50 TABLET, FILM COATED ORAL at 08:30

## 2024-04-14 RX ADMIN — CETIRIZINE HYDROCHLORIDE 5 MG: 10 TABLET, FILM COATED ORAL at 20:56

## 2024-04-14 RX ADMIN — SODIUM CHLORIDE, PRESERVATIVE FREE 10 ML: 5 INJECTION INTRAVENOUS at 20:58

## 2024-04-14 RX ADMIN — SERTRALINE HYDROCHLORIDE 50 MG: 50 TABLET ORAL at 08:31

## 2024-04-14 RX ADMIN — ACETAMINOPHEN 650 MG: 325 TABLET ORAL at 18:10

## 2024-04-14 RX ADMIN — BARICITINIB 4 MG: 2 TABLET, FILM COATED ORAL at 08:31

## 2024-04-14 RX ADMIN — LOPERAMIDE HYDROCHLORIDE 2 MG: 2 CAPSULE ORAL at 05:59

## 2024-04-14 RX ADMIN — SODIUM CHLORIDE, PRESERVATIVE FREE 10 ML: 5 INJECTION INTRAVENOUS at 08:33

## 2024-04-14 RX ADMIN — HYDRALAZINE HYDROCHLORIDE 25 MG: 25 TABLET ORAL at 20:56

## 2024-04-14 RX ADMIN — Medication 2 PUFF: at 19:00

## 2024-04-14 RX ADMIN — Medication 2 PUFF: at 07:51

## 2024-04-14 RX ADMIN — LATANOPROST 1 DROP: 50 SOLUTION OPHTHALMIC at 20:59

## 2024-04-14 RX ADMIN — FAMOTIDINE 20 MG: 20 TABLET, FILM COATED ORAL at 20:57

## 2024-04-14 ASSESSMENT — PAIN SCALES - GENERAL
PAINLEVEL_OUTOF10: 6
PAINLEVEL_OUTOF10: 3

## 2024-04-14 ASSESSMENT — PAIN DESCRIPTION - DESCRIPTORS: DESCRIPTORS: ACHING

## 2024-04-14 ASSESSMENT — PAIN DESCRIPTION - ORIENTATION: ORIENTATION: LEFT

## 2024-04-14 ASSESSMENT — PAIN DESCRIPTION - LOCATION: LOCATION: SHOULDER

## 2024-04-14 NOTE — PROGRESS NOTES
Pulmonary/ CC progress note  Ms. Taryn Corona is a 79 years old female who is known to have history of hyperlipidemia, hypothyroidism, hypertension.  She was diagnosed with colon cancer with metastasis who has been on chemotherapy.  She presents to the emergency department because of symptoms of worsening cough along with shortness of breath.  Cough was aggressive mostly dry in nature resulting in feeling of shortness of breath.  She started having chest muscle pains because of her persistent coughing.  In ER she was found to be afebrile.  She was found to be hypoxic with oxygen saturation 75%.  She was placed on 6 L of nasal cannula oxygen.  When I saw her she was saturating 97%, supplemental oxygen was cut down to 2 L/min.  Patient claims that she has history of asthma but was not using her inhalers on regular basis.  In the ER she was tested positive for COVID-19 pneumonia.  CT scan of chest was done which showed left lower lobe atelectasis along with diffuse subpleural fibrosis with groundglass opacities.  No filling defects noted to suggest any pulmonary embolism.  Subjective:     Patient examined at bedside  Coughing has shown some improvement.  Shortness of breath better    Patient Active Problem List   Diagnosis    Acquired hypothyroidism    Anxiety    Essential hypertension    Obesity, morbid (HCC)    Multinodular goiter (nontoxic)    Dyspnea    Asthma exacerbation    Mild obstructive sleep apnea    Pneumonia due to COVID-19 virus    Acute respiratory failure with hypoxia (HCC)     Past Medical History:   Diagnosis Date    Acid reflux     Asthma     Cancer (HCC)     Hypercholesteremia     Hypertension     Hypothyroidism       History reviewed. No pertinent family history.   Social History     Tobacco Use    Smoking status: Former     Current packs/day: 0.00     Types: Cigarettes     Quit date: 2013     Years since quittin.2     Passive exposure: Never    Smokeless tobacco: Never   Substance Use  latanoprost (XALATAN) 0.005 % ophthalmic solution 1 drop  1 drop Both Eyes Nightly    losartan (COZAAR) tablet 50 mg  50 mg Oral Daily    sertraline (ZOLOFT) tablet 50 mg  50 mg Oral Daily    budesonide-formoterol (SYMBICORT) 160-4.5 MCG/ACT inhaler 2 puff  2 puff Inhalation BID RT    sodium chloride flush 0.9 % injection 5-40 mL  5-40 mL IntraVENous 2 times per day    sodium chloride flush 0.9 % injection 5-40 mL  5-40 mL IntraVENous PRN    0.9 % sodium chloride infusion   IntraVENous PRN    enoxaparin (LOVENOX) injection 40 mg  40 mg SubCUTAneous Daily    ondansetron (ZOFRAN-ODT) disintegrating tablet 4 mg  4 mg Oral Q8H PRN    Or    ondansetron (ZOFRAN) injection 4 mg  4 mg IntraVENous Q6H PRN    acetaminophen (TYLENOL) tablet 650 mg  650 mg Oral Q6H PRN    Or    acetaminophen (TYLENOL) suppository 650 mg  650 mg Rectal Q6H PRN    [Held by provider] dexAMETHasone (DECADRON) tablet 6 mg  6 mg Oral Daily    baricitinib (OLUMIANT) tablet 4 mg  4 mg Oral Daily        Exam:    General:  Alert, cooperative, well noursished, well developed, appears stated age   Eyes:  Sclera anicteric. Pupils equally round and reactive to light.   Mouth/Throat: Mucous membranes normal, oral pharynx clear   Neck: Supple   Lungs:   Clear to auscultation bilaterally, good effort   CV:  Regular rate and rhythm,no murmur, click, rub or gallop   Abdomen:   Soft, non-tender. bowel sounds normal. non-distended   Extremities: No cyanosis or edema   Skin: Skin color, texture, turgor normal. no acute rash or lesions   Lymph nodes: Cervical and supraclavicular normal   Musculoskeletal: No swelling or deformity   Lines/Devices:  Intact, no erythema, drainage or tenderness   Psych: Alert and oriented, normal mood affect given the setting       Impression:   This is an elderly female with known history of hypertension, hypothyroidism, hyperlipidemia.  She has colon cancer with metastasis and was getting chemotherapy.  She developed aggressive cough

## 2024-04-14 NOTE — PROGRESS NOTES
Hospitalist Progress Note    NAME:   Taryn Corona   : 1945   MRN: [id number]     Date/Time: 2024 11:29 AM  Patient PCP: Afsaneh Alcaraz MD    Estimated discharge date: 48 hours  Barriers: Wean O2, IV steroids,    Hospital Course:  Taryn Corona is a 79-year-old female with hypertension, asthma, hyperlipidemia, hypothyroidism, colon cancer with metastasis on chemotherapy admitted 4/10/2024 for cough and shortness of breath.  Patient hypoxic and requiring 5 L via nasal cannula, weaned to 2.5 L via nasal cannula.  Patient tested positive for COVID at home. Pt did not receive any COVID vaccines. CTA chest shows left lower lobe atelectasis, diffuse subpleural fibrosis with groundglass opacities.  Pulmonary embolism ruled out.  Continue DuoNebs.  Continue IV steroids and baricitinib.  Pulmonology consulted. oncology following.    Assessment / Plan:  Acute respiratory failure with hypoxia  COVID-19, pneumonitis  Leukocytosis  - Continue supplementation with oxygen, titrating SpO2 90 to 95%.  2.5 L via NC  - Continue IV steroid and baricitinib  - Continue DuoNebs  - Pulmonary consult placed    Chronic obstructive asthma  -Continue Symbicort (substituted for Advair)  - Continue DuoNebs    Hypokalemia  - 2.8> 3.3>2.9>3.4>3.5, continue supplementation and continue to monitor    Mild normocytic anemia  - Hemoglobin 10.1>9.5>9.7, will continue monitor    Hypertension  -Continue amlodipine, hydralazine, losartan    Hypothyroidism  - Continue levothyroxine    Carcinoma of the colon with metastasis  -follows with [facility];  -She completed 3 cycles of CapeOx after which the regimen was discontinued due to persistent/severe diarrhea  -Started on FOLFOX on 2024 which she is tolerating better  -Last treatment was on 3/19/2024.  Treatment was held on 2024 due to neutropenia  -Scheduled for next treatment on 2024 which may need to be delayed further.  -CT report shows 19 mm nodule in the left breast,

## 2024-04-14 NOTE — PROGRESS NOTES
Hematology/Oncology   Progress Note    Patient: Taryn Corona MRN: [id number]     YOB: 1945  Age: 79 y.o.  Sex: female      Admit Date: 4/10/2024    LOS: 4 days     Chief Complaint: SOB, cough  HemOnc: colon cancer  Subjective:     Patient lying in bed, NAD. Daughter present at the bedside. Desaturation with activity    Review of Systems:  Constitutional No fevers, chills, night sweats, excessive fatigue or weight loss.   ENMT No problems with hearing, no sore throat, no sinus drainage.   Hematologic/Lymphatic No easy bruising or bleeding.   Respiratory Cough +, SOB +   Cardiovascular No anginal chest pain, tachycardia, palpitations.   Gastrointestinal No nausea, vomiting, GI bleeding   Genitourinary (M) No hematuria, dysuria   Musculoskeletal No joint pain, swelling or redness.   Neurologic No headache, blurred vision     Current Facility-Administered Medications:     guaiFENesin-codeine (guaiFENesin AC) 100-10 MG/5ML liquid 5 mL, 5 mL, Oral, Q8H RT, Shailesh Liriano MD, 5 mL at 04/14/24 0830    cetirizine (ZYRTEC) tablet 5 mg, 5 mg, Oral, Nightly, Sweetie Sorto PA-C, 5 mg at 04/13/24 2042    doxycycline hyclate (VIBRAMYCIN) capsule 100 mg, 100 mg, Oral, BID, Sai Morel MD, 100 mg at 04/14/24 1136    famotidine (PEPCID) tablet 20 mg, 20 mg, Oral, BID, Sweetie Sorto PA-C, 20 mg at 04/14/24 0830    loperamide (IMODIUM) capsule 2 mg, 2 mg, Oral, 4x Daily PRN, Sweetie Sorto PA-C, 2 mg at 04/14/24 0559    ipratropium 0.5 mg-albuterol 2.5 mg (DUONEB) nebulizer solution 1 Dose, 1 Dose, Inhalation, Q4H PRN, Sweetie Sorto PA-C    methylPREDNISolone sodium succ (SOLU-MEDROL) 60 mg in sterile water 0.96 mL injection, 60 mg, IntraVENous, Q8H, Carlton Gallo MD, 60 mg at 04/14/24 0832    albuterol sulfate HFA (PROVENTIL;VENTOLIN;PROAIR) 108 (90 Base) MCG/ACT inhaler 2 puff, 2 puff, Inhalation, Q6H PRN, Sai Morel MD    levothyroxine (SYNTHROID) tablet 75 mcg, 75

## 2024-04-14 NOTE — PLAN OF CARE
Problem: Discharge Planning  Goal: Discharge to home or other facility with appropriate resources  4/13/2024 2236 by Miguel Knight RN  Outcome: Progressing  4/13/2024 0956 by Christopher Shaw RN  Outcome: Progressing     Problem: Pain  Goal: Verbalizes/displays adequate comfort level or baseline comfort level  4/13/2024 2236 by Miguel Knight RN  Outcome: Progressing  4/13/2024 0956 by Christopher Shaw RN  Outcome: Progressing     Problem: ABCDS Injury Assessment  Goal: Absence of physical injury  4/13/2024 2236 by Miguel Knight RN  Outcome: Progressing  4/13/2024 0956 by Christopher Shaw RN  Outcome: Progressing     Problem: Safety - Adult  Goal: Free from fall injury  4/13/2024 2236 by Miguel Knight RN  Outcome: Progressing  4/13/2024 0956 by Christopher Shaw RN  Outcome: Progressing     Problem: Skin/Tissue Integrity  Goal: Absence of new skin breakdown  Description: 1.  Monitor for areas of redness and/or skin breakdown  2.  Assess vascular access sites hourly  3.  Every 4-6 hours minimum:  Change oxygen saturation probe site  4.  Every 4-6 hours:  If on nasal continuous positive airway pressure, respiratory therapy assess nares and determine need for appliance change or resting period.  4/13/2024 2236 by Miguel Knight RN  Outcome: Progressing  4/13/2024 0956 by Christopher Shaw RN  Outcome: Progressing  4/13/2024 0956 by Chritsopher Shaw RN  Outcome: Progressing

## 2024-04-15 LAB
ANION GAP SERPL CALC-SCNC: 9 MMOL/L (ref 5–15)
BASOPHILS # BLD: 0 K/UL (ref 0–0.1)
BASOPHILS NFR BLD: 0 % (ref 0–1)
BUN SERPL-MCNC: 18 MG/DL (ref 6–20)
BUN/CREAT SERPL: 29 (ref 12–20)
CA-I BLD-MCNC: 8.7 MG/DL (ref 8.5–10.1)
CHLORIDE SERPL-SCNC: 102 MMOL/L (ref 97–108)
CO2 SERPL-SCNC: 26 MMOL/L (ref 21–32)
CREAT SERPL-MCNC: 0.62 MG/DL (ref 0.55–1.02)
DIFFERENTIAL METHOD BLD: ABNORMAL
EOSINOPHIL # BLD: 0 K/UL (ref 0–0.4)
EOSINOPHIL NFR BLD: 0 % (ref 0–7)
ERYTHROCYTE [DISTWIDTH] IN BLOOD BY AUTOMATED COUNT: 15 % (ref 11.5–14.5)
GLUCOSE SERPL-MCNC: 144 MG/DL (ref 65–100)
HCT VFR BLD AUTO: 28.9 % (ref 35–47)
HGB BLD-MCNC: 9.6 G/DL (ref 11.5–16)
IMM GRANULOCYTES # BLD AUTO: 0 K/UL
IMM GRANULOCYTES NFR BLD AUTO: 0 %
LYMPHOCYTES # BLD: 0.9 K/UL (ref 0.8–3.5)
LYMPHOCYTES NFR BLD: 5 % (ref 12–49)
MCH RBC QN AUTO: 28 PG (ref 26–34)
MCHC RBC AUTO-ENTMCNC: 33.2 G/DL (ref 30–36.5)
MCV RBC AUTO: 84.3 FL (ref 80–99)
MONOCYTES # BLD: 0.9 K/UL (ref 0–1)
MONOCYTES NFR BLD: 5 % (ref 5–13)
NEUTS BAND NFR BLD MANUAL: 5 % (ref 0–6)
NEUTS SEG # BLD: 15.7 K/UL (ref 1.8–8)
NEUTS SEG NFR BLD: 85 % (ref 32–75)
NRBC # BLD: 0.03 K/UL (ref 0–0.01)
NRBC BLD-RTO: 0.2 PER 100 WBC
PLATELET # BLD AUTO: 294 K/UL (ref 150–400)
PMV BLD AUTO: 9.9 FL (ref 8.9–12.9)
POTASSIUM SERPL-SCNC: 3.5 MMOL/L (ref 3.5–5.1)
RBC # BLD AUTO: 3.43 M/UL (ref 3.8–5.2)
RBC MORPH BLD: ABNORMAL
SODIUM SERPL-SCNC: 137 MMOL/L (ref 136–145)
WBC # BLD AUTO: 17.5 K/UL (ref 3.6–11)

## 2024-04-15 PROCEDURE — 6370000000 HC RX 637 (ALT 250 FOR IP): Performed by: HOSPITALIST

## 2024-04-15 PROCEDURE — 2580000003 HC RX 258: Performed by: INTERNAL MEDICINE

## 2024-04-15 PROCEDURE — 80048 BASIC METABOLIC PNL TOTAL CA: CPT

## 2024-04-15 PROCEDURE — 6360000002 HC RX W HCPCS: Performed by: NURSE PRACTITIONER

## 2024-04-15 PROCEDURE — 2700000000 HC OXYGEN THERAPY PER DAY

## 2024-04-15 PROCEDURE — 94761 N-INVAS EAR/PLS OXIMETRY MLT: CPT

## 2024-04-15 PROCEDURE — 94640 AIRWAY INHALATION TREATMENT: CPT

## 2024-04-15 PROCEDURE — 2580000003 HC RX 258: Performed by: HOSPITALIST

## 2024-04-15 PROCEDURE — 6370000000 HC RX 637 (ALT 250 FOR IP)

## 2024-04-15 PROCEDURE — 36415 COLL VENOUS BLD VENIPUNCTURE: CPT

## 2024-04-15 PROCEDURE — 85025 COMPLETE CBC W/AUTO DIFF WBC: CPT

## 2024-04-15 PROCEDURE — 1100000000 HC RM PRIVATE

## 2024-04-15 PROCEDURE — 6360000002 HC RX W HCPCS: Performed by: HOSPITALIST

## 2024-04-15 PROCEDURE — 6370000000 HC RX 637 (ALT 250 FOR IP): Performed by: INTERNAL MEDICINE

## 2024-04-15 PROCEDURE — 6360000002 HC RX W HCPCS: Performed by: INTERNAL MEDICINE

## 2024-04-15 RX ORDER — METHYLPREDNISOLONE SODIUM SUCCINATE 40 MG/ML
40 INJECTION, POWDER, LYOPHILIZED, FOR SOLUTION INTRAMUSCULAR; INTRAVENOUS EVERY 8 HOURS
Status: DISCONTINUED | OUTPATIENT
Start: 2024-04-15 | End: 2024-04-17 | Stop reason: HOSPADM

## 2024-04-15 RX ADMIN — Medication 2 PUFF: at 07:56

## 2024-04-15 RX ADMIN — HYDRALAZINE HYDROCHLORIDE 25 MG: 25 TABLET ORAL at 21:08

## 2024-04-15 RX ADMIN — Medication 2 PUFF: at 20:16

## 2024-04-15 RX ADMIN — GUAIFENESIN AND CODEINE PHOSPHATE 5 ML: 10; 100 LIQUID ORAL at 09:35

## 2024-04-15 RX ADMIN — SERTRALINE HYDROCHLORIDE 50 MG: 50 TABLET ORAL at 09:35

## 2024-04-15 RX ADMIN — AMLODIPINE BESYLATE 5 MG: 5 TABLET ORAL at 09:35

## 2024-04-15 RX ADMIN — METHYLPREDNISOLONE SODIUM SUCCINATE 60 MG: 125 INJECTION INTRAMUSCULAR; INTRAVENOUS at 09:36

## 2024-04-15 RX ADMIN — METHYLPREDNISOLONE SODIUM SUCCINATE 40 MG: 40 INJECTION INTRAMUSCULAR; INTRAVENOUS at 17:32

## 2024-04-15 RX ADMIN — GUAIFENESIN AND CODEINE PHOSPHATE 5 ML: 10; 100 LIQUID ORAL at 17:32

## 2024-04-15 RX ADMIN — ENOXAPARIN SODIUM 40 MG: 100 INJECTION SUBCUTANEOUS at 09:34

## 2024-04-15 RX ADMIN — BARICITINIB 4 MG: 2 TABLET, FILM COATED ORAL at 09:36

## 2024-04-15 RX ADMIN — LOPERAMIDE HYDROCHLORIDE 2 MG: 2 CAPSULE ORAL at 21:08

## 2024-04-15 RX ADMIN — LOPERAMIDE HYDROCHLORIDE 2 MG: 2 CAPSULE ORAL at 06:21

## 2024-04-15 RX ADMIN — FAMOTIDINE 20 MG: 20 TABLET, FILM COATED ORAL at 21:08

## 2024-04-15 RX ADMIN — CETIRIZINE HYDROCHLORIDE 5 MG: 10 TABLET, FILM COATED ORAL at 21:08

## 2024-04-15 RX ADMIN — SODIUM CHLORIDE, PRESERVATIVE FREE 10 ML: 5 INJECTION INTRAVENOUS at 09:43

## 2024-04-15 RX ADMIN — HYDRALAZINE HYDROCHLORIDE 25 MG: 25 TABLET ORAL at 09:35

## 2024-04-15 RX ADMIN — LOSARTAN POTASSIUM 50 MG: 50 TABLET, FILM COATED ORAL at 09:35

## 2024-04-15 RX ADMIN — ACETAMINOPHEN 650 MG: 325 TABLET ORAL at 13:11

## 2024-04-15 RX ADMIN — SODIUM CHLORIDE, PRESERVATIVE FREE 10 ML: 5 INJECTION INTRAVENOUS at 21:11

## 2024-04-15 RX ADMIN — FAMOTIDINE 20 MG: 20 TABLET, FILM COATED ORAL at 09:35

## 2024-04-15 RX ADMIN — LEVOTHYROXINE SODIUM 75 MCG: 0.03 TABLET ORAL at 06:21

## 2024-04-15 RX ADMIN — METHYLPREDNISOLONE SODIUM SUCCINATE 60 MG: 125 INJECTION INTRAMUSCULAR; INTRAVENOUS at 02:22

## 2024-04-15 RX ADMIN — LATANOPROST 1 DROP: 50 SOLUTION OPHTHALMIC at 21:16

## 2024-04-15 RX ADMIN — DOXYCYCLINE HYCLATE 100 MG: 100 CAPSULE ORAL at 13:07

## 2024-04-15 ASSESSMENT — ENCOUNTER SYMPTOMS
COUGH: 1
NAUSEA: 0
BLOOD IN STOOL: 0
SHORTNESS OF BREATH: 1
ABDOMINAL PAIN: 0
VOMITING: 0
DIARRHEA: 1

## 2024-04-15 ASSESSMENT — PAIN SCALES - GENERAL
PAINLEVEL_OUTOF10: 2
PAINLEVEL_OUTOF10: 0
PAINLEVEL_OUTOF10: 6

## 2024-04-15 ASSESSMENT — PAIN DESCRIPTION - DESCRIPTORS: DESCRIPTORS: ACHING

## 2024-04-15 ASSESSMENT — PAIN DESCRIPTION - LOCATION: LOCATION: CHEST

## 2024-04-15 NOTE — CARE COORDINATION
0705: Chart reviewed.    Per notes; patient on IV steroids and supplemental oxygen followed via oncology and pulmonology.    Current Dispo: Home with Daughter.    CM will continue to follow patient and recs of medical team.    1420: CM to monitor for possible home oxygen.    PT/OT evals pending discharge recs.

## 2024-04-15 NOTE — PROGRESS NOTES
Hospitalist Progress Note    NAME:   Taryn Corona   : 1945   MRN: [id number]     Date/Time: 4/15/2024 1:03 PM  Patient PCP: Afsaneh Alcaraz MD    Estimated discharge date: 48 hours  Barriers: Wean O2, IV steroids,    Hospital Course:  Taryn Corona is a 79-year-old female with hypertension, asthma, hyperlipidemia, hypothyroidism, colon cancer with metastasis on chemotherapy admitted 4/10/2024 for cough and shortness of breath.  Patient hypoxic and requiring 5 L via nasal cannula, weaned to 2.5 L via nasal cannula.  Patient tested positive for COVID at home. Pt did not receive any COVID vaccines. CTA chest shows left lower lobe atelectasis, diffuse subpleural fibrosis with groundglass opacities.  Pulmonary embolism ruled out.  Continue DuoNebs.  Continue IV steroids and baricitinib.  Pulmonology and oncology following.    Assessment / Plan:  Acute respiratory failure with hypoxia  COVID-19, pneumonitis  Leukocytosis  - WBC  17.5  - Continue supplementation with oxygen, titrating SpO2 90 to 95%.  2.5 L via NC. Continue to wean as tolerated  - Continue IV steroid and baricitinib  - Continue DuoNebs  - Pulmonary following, appreciate recommendations  - Will need ambulatory oximetry for home O2    Chronic obstructive asthma  -Continue Symbicort (substituted for Advair)  - Continue DuoNebs    Hypokalemia  - Resolved  - Continue to monitor    Mild normocytic anemia  - Hemoglobin 10.1>9.5>9.7>9.6, will continue monitor    Hypertension  - Blood pressure at goal  - Continue amlodipine, hydralazine, losartan    Hypothyroidism  - Continue levothyroxine    Carcinoma of the colon with metastasis  -follows with Dr. Gallo;  -She completed 3 cycles of CapeOx after which the regimen was discontinued due to persistent/severe diarrhea  -Started on FOLFOX on 2024 which she is tolerating better  -Last treatment was on 3/19/2024.  Treatment was held on 2024 due to neutropenia  -Scheduled for next treatment on  - NP

## 2024-04-15 NOTE — PROGRESS NOTES
Pulmonary/ CC progress note  Ms. Taryn Corona is a 79 years old female who is known to have history of hyperlipidemia, hypothyroidism, hypertension.  She was diagnosed with colon cancer with metastasis who has been on chemotherapy.  She presents to the emergency department because of symptoms of worsening cough along with shortness of breath.  Cough was aggressive mostly dry in nature resulting in feeling of shortness of breath.  She started having chest muscle pains because of her persistent coughing.  In ER she was found to be afebrile.  She was found to be hypoxic with oxygen saturation 75%.  She was placed on 6 L of nasal cannula oxygen.  When I saw her she was saturating 97%, supplemental oxygen was cut down to 2 L/min.  Patient claims that she has history of asthma but was not using her inhalers on regular basis.  In the ER she was tested positive for COVID-19 pneumonia.  CT scan of chest was done which showed left lower lobe atelectasis along with diffuse subpleural fibrosis with groundglass opacities.  No filling defects noted to suggest any pulmonary embolism.  Subjective:     Patient seen and examined.  Discussed with the nursing staff.  Family at the bedside.  She is on 2 L oxygen.  She is breathing better.  Just somewhat tired.  Some sputum production.  Appetite is fair.  She is walking to the bathroom.  She tells me that she smoked in the remote past.  She has never been vaccinated and never had COVID before.    On 4/14/2024: Patient examined at bedside  Coughing has shown some improvement.  Shortness of breath better    Patient Active Problem List   Diagnosis    Acquired hypothyroidism    Anxiety    Essential hypertension    Obesity, morbid (HCC)    Multinodular goiter (nontoxic)    Dyspnea    Asthma exacerbation    Mild obstructive sleep apnea    Pneumonia due to COVID-19 virus    Acute respiratory failure with hypoxia (HCC)     Past Medical History:   Diagnosis Date    Acid reflux     Asthma

## 2024-04-16 ENCOUNTER — APPOINTMENT (OUTPATIENT)
Facility: HOSPITAL | Age: 79
DRG: 871 | End: 2024-04-16
Payer: MEDICARE

## 2024-04-16 LAB
ANION GAP SERPL CALC-SCNC: 10 MMOL/L (ref 5–15)
BASOPHILS # BLD: 0 K/UL (ref 0–0.1)
BASOPHILS NFR BLD: 0 % (ref 0–1)
BUN SERPL-MCNC: 21 MG/DL (ref 6–20)
BUN/CREAT SERPL: 28 (ref 12–20)
CA-I BLD-MCNC: 9.3 MG/DL (ref 8.5–10.1)
CHLORIDE SERPL-SCNC: 101 MMOL/L (ref 97–108)
CO2 SERPL-SCNC: 24 MMOL/L (ref 21–32)
CREAT SERPL-MCNC: 0.74 MG/DL (ref 0.55–1.02)
CRP SERPL-MCNC: <0.29 MG/DL (ref 0–0.3)
DIFFERENTIAL METHOD BLD: ABNORMAL
EOSINOPHIL # BLD: 0 K/UL (ref 0–0.4)
EOSINOPHIL NFR BLD: 0 % (ref 0–7)
ERYTHROCYTE [DISTWIDTH] IN BLOOD BY AUTOMATED COUNT: 15.5 % (ref 11.5–14.5)
GLUCOSE SERPL-MCNC: 160 MG/DL (ref 65–100)
HCT VFR BLD AUTO: 30.5 % (ref 35–47)
HGB BLD-MCNC: 10.3 G/DL (ref 11.5–16)
IMM GRANULOCYTES # BLD AUTO: 0 K/UL
IMM GRANULOCYTES NFR BLD AUTO: 0 %
LYMPHOCYTES # BLD: 1.5 K/UL (ref 0.8–3.5)
LYMPHOCYTES NFR BLD: 8 % (ref 12–49)
MCH RBC QN AUTO: 28.4 PG (ref 26–34)
MCHC RBC AUTO-ENTMCNC: 33.8 G/DL (ref 30–36.5)
MCV RBC AUTO: 84 FL (ref 80–99)
MONOCYTES # BLD: 0.6 K/UL (ref 0–1)
MONOCYTES NFR BLD: 3 % (ref 5–13)
MYELOCYTES NFR BLD MANUAL: 2 %
NEUTS BAND NFR BLD MANUAL: 2 % (ref 0–6)
NEUTS SEG # BLD: 16.8 K/UL (ref 1.8–8)
NEUTS SEG NFR BLD: 85 % (ref 32–75)
NRBC # BLD: 0 K/UL (ref 0–0.01)
NRBC BLD-RTO: 0 PER 100 WBC
PLATELET # BLD AUTO: 294 K/UL (ref 150–400)
PMV BLD AUTO: 9.9 FL (ref 8.9–12.9)
POTASSIUM SERPL-SCNC: 3.1 MMOL/L (ref 3.5–5.1)
RBC # BLD AUTO: 3.63 M/UL (ref 3.8–5.2)
RBC MORPH BLD: ABNORMAL
SODIUM SERPL-SCNC: 135 MMOL/L (ref 136–145)
WBC # BLD AUTO: 19.3 K/UL (ref 3.6–11)

## 2024-04-16 PROCEDURE — 2580000003 HC RX 258: Performed by: HOSPITALIST

## 2024-04-16 PROCEDURE — 6370000000 HC RX 637 (ALT 250 FOR IP): Performed by: HOSPITALIST

## 2024-04-16 PROCEDURE — 36415 COLL VENOUS BLD VENIPUNCTURE: CPT

## 2024-04-16 PROCEDURE — 97165 OT EVAL LOW COMPLEX 30 MIN: CPT

## 2024-04-16 PROCEDURE — 6360000002 HC RX W HCPCS: Performed by: NURSE PRACTITIONER

## 2024-04-16 PROCEDURE — 1100000000 HC RM PRIVATE

## 2024-04-16 PROCEDURE — 6360000002 HC RX W HCPCS: Performed by: HOSPITALIST

## 2024-04-16 PROCEDURE — 80048 BASIC METABOLIC PNL TOTAL CA: CPT

## 2024-04-16 PROCEDURE — 94761 N-INVAS EAR/PLS OXIMETRY MLT: CPT

## 2024-04-16 PROCEDURE — 6370000000 HC RX 637 (ALT 250 FOR IP): Performed by: INTERNAL MEDICINE

## 2024-04-16 PROCEDURE — 94640 AIRWAY INHALATION TREATMENT: CPT

## 2024-04-16 PROCEDURE — 97530 THERAPEUTIC ACTIVITIES: CPT

## 2024-04-16 PROCEDURE — 86140 C-REACTIVE PROTEIN: CPT

## 2024-04-16 PROCEDURE — 71045 X-RAY EXAM CHEST 1 VIEW: CPT

## 2024-04-16 PROCEDURE — 2700000000 HC OXYGEN THERAPY PER DAY

## 2024-04-16 PROCEDURE — 85025 COMPLETE CBC W/AUTO DIFF WBC: CPT

## 2024-04-16 PROCEDURE — 6370000000 HC RX 637 (ALT 250 FOR IP): Performed by: NURSE PRACTITIONER

## 2024-04-16 PROCEDURE — 6370000000 HC RX 637 (ALT 250 FOR IP)

## 2024-04-16 RX ORDER — SIMETHICONE 80 MG
80 TABLET,CHEWABLE ORAL EVERY 6 HOURS PRN
Status: DISCONTINUED | OUTPATIENT
Start: 2024-04-16 | End: 2024-04-17 | Stop reason: HOSPADM

## 2024-04-16 RX ADMIN — SIMETHICONE 80 MG: 80 TABLET, CHEWABLE ORAL at 11:09

## 2024-04-16 RX ADMIN — LEVOTHYROXINE SODIUM 75 MCG: 0.03 TABLET ORAL at 07:36

## 2024-04-16 RX ADMIN — FAMOTIDINE 20 MG: 20 TABLET, FILM COATED ORAL at 20:59

## 2024-04-16 RX ADMIN — HYDRALAZINE HYDROCHLORIDE 25 MG: 25 TABLET ORAL at 20:59

## 2024-04-16 RX ADMIN — Medication 2 PUFF: at 20:19

## 2024-04-16 RX ADMIN — GUAIFENESIN AND CODEINE PHOSPHATE 5 ML: 10; 100 LIQUID ORAL at 00:08

## 2024-04-16 RX ADMIN — SODIUM CHLORIDE, PRESERVATIVE FREE 10 ML: 5 INJECTION INTRAVENOUS at 10:43

## 2024-04-16 RX ADMIN — SERTRALINE HYDROCHLORIDE 50 MG: 50 TABLET ORAL at 10:42

## 2024-04-16 RX ADMIN — GUAIFENESIN AND CODEINE PHOSPHATE 5 ML: 10; 100 LIQUID ORAL at 10:43

## 2024-04-16 RX ADMIN — HYDRALAZINE HYDROCHLORIDE 25 MG: 25 TABLET ORAL at 10:42

## 2024-04-16 RX ADMIN — BARICITINIB 4 MG: 2 TABLET, FILM COATED ORAL at 10:43

## 2024-04-16 RX ADMIN — METHYLPREDNISOLONE SODIUM SUCCINATE 40 MG: 40 INJECTION INTRAMUSCULAR; INTRAVENOUS at 16:26

## 2024-04-16 RX ADMIN — AMLODIPINE BESYLATE 5 MG: 5 TABLET ORAL at 10:43

## 2024-04-16 RX ADMIN — DOXYCYCLINE HYCLATE 100 MG: 100 CAPSULE ORAL at 10:42

## 2024-04-16 RX ADMIN — METHYLPREDNISOLONE SODIUM SUCCINATE 40 MG: 40 INJECTION INTRAMUSCULAR; INTRAVENOUS at 02:05

## 2024-04-16 RX ADMIN — CETIRIZINE HYDROCHLORIDE 5 MG: 10 TABLET, FILM COATED ORAL at 20:59

## 2024-04-16 RX ADMIN — SODIUM CHLORIDE, PRESERVATIVE FREE 10 ML: 5 INJECTION INTRAVENOUS at 21:00

## 2024-04-16 RX ADMIN — Medication 2 PUFF: at 08:19

## 2024-04-16 RX ADMIN — LOSARTAN POTASSIUM 50 MG: 50 TABLET, FILM COATED ORAL at 10:42

## 2024-04-16 RX ADMIN — FAMOTIDINE 20 MG: 20 TABLET, FILM COATED ORAL at 10:42

## 2024-04-16 RX ADMIN — METHYLPREDNISOLONE SODIUM SUCCINATE 40 MG: 40 INJECTION INTRAMUSCULAR; INTRAVENOUS at 10:42

## 2024-04-16 RX ADMIN — GUAIFENESIN AND CODEINE PHOSPHATE 5 ML: 10; 100 LIQUID ORAL at 16:26

## 2024-04-16 RX ADMIN — ENOXAPARIN SODIUM 40 MG: 100 INJECTION SUBCUTANEOUS at 10:41

## 2024-04-16 RX ADMIN — DOXYCYCLINE HYCLATE 100 MG: 100 CAPSULE ORAL at 00:08

## 2024-04-16 RX ADMIN — LATANOPROST 1 DROP: 50 SOLUTION OPHTHALMIC at 21:06

## 2024-04-16 ASSESSMENT — PAIN SCALES - GENERAL: PAINLEVEL_OUTOF10: 0

## 2024-04-16 NOTE — PLAN OF CARE
fatigue. Pt with decreased O2 stats (see below), returned to semi supine with sba. Pt left in no acute distress, daughter in room. Pt titrated down to 2L once in semi supine and stating above 90%.Pt will benefit from continued skilled OT services to address current impairments and improve IND and safety with self cares and functional transfers/mobility. Potential barriers for safe discharge: pts current support system is unable to meet their requirements for physical assistance and concern for pt safely navigating or managing the home environment. Current OT d/c recommendation Home with Home Health Therapy and family assistonce medically appropriate.     Start of Session Sitting eob Standing after marches End of Session   SPO2 (%) 94 on 2L 91 on 2L 82 on 2L 91 on 3L   Heart Rate (BPM) 74 85 96 76     GOALS:    Problem: Occupational Therapy - Adult  Goal: By Discharge: Performs self-care activities at highest level of function for planned discharge setting.  See evaluation for individualized goals.  Description: FUNCTIONAL STATUS PRIOR TO ADMISSION:  Mod I with rollator in community, does not use AD in home with functional mobility. Daughter assists at supervision level for all adls.    HOME SUPPORT: The patient lived with daughter and was supervision for adls.    Occupational Therapy Goals:  Initiated 4/16/2024  Patient/Family stated goal: to get back home  Patient will perform grooming in standing with Modified [place] within 7 day(s).  Patient will perform UB bathing with Modified [place] within 7 day(s).  Patient will perform LB bathing with Modified [place] within 7 day(s).  Patient will perform toilet transfers with Modified [place]  within 7 day(s).  Patient will perform all aspects of toileting with Modified [place] within 7 day(s).  Patient will participate in upper extremity therapeutic exercise/activities with Modified [place]  within 7 day(s).    Patient will utilize energy  Assistance: Independent  Ambulation Assistance: Needs assistance  Transfer Assistance: Needs assistance    Hand Dominance: right     EXAMINATION OF PERFORMANCE DEFICITS:    Cognitive/Behavioral Status:  Orientation  Overall Orientation Status: Within Normal Limits  Orientation Level: Oriented X4  Cognition  Overall Cognitive Status: WFL    Skin: intact    Edema: none noted    Hearing:   Hearing  Hearing: Within functional limits    Vision/Perceptual:          Vision  Vision: Within Functional Limits       Range of Motion:     AROM: Generally decreased, functional       Strength:         Coordination:               Tone & Sensation:     Tone: Normal  Sensation: Intact      Functional Mobility and Transfers for ADLs:  Bed Mobility:  Bed Mobility Training  Bed Mobility Training: Yes  Overall Level of Assistance: Stand-by assistance  Rolling: Stand-by assistance  Supine to Sit: Stand-by assistance;Modified independent  Sit to Supine: Stand-by assistance;Modified independent  Scooting: Stand-by assistance    Transfers:  Transfer Training  Transfer Training: Yes  Overall Level of Assistance: Contact-guard assistance;Adaptive equipment  Sit to Stand: Stand-by assistance;Adaptive equipment  Stand to Sit: Stand-by assistance;Adaptive equipment  Toilet Transfer: Contact-guard assistance;Adaptive equipment      Balance:  Balance  Sitting: Intact  Standing: Impaired  Standing - Static: Good  Standing - Dynamic: Fair      ADL Assessment:          Feeding: Setup       Grooming: Setup                               LE Dressing: Moderate assistance                      Therapeutic Exercises:       Functional Measure:    [employer] AM-PACTM \"6 Clicks\"                                                       Daily Activity Inpatient Short Form  How much help from another person does the patient currently need... Total; A Lot A Little None   1.  Putting on and taking off regular lower body clothing? []  1 [x]  2 []  3 []  4   2.

## 2024-04-16 NOTE — PROGRESS NOTES
Pulmonary/ CC progress note  Ms. Taryn Corona is a 79 years old female who is known to have history of hyperlipidemia, hypothyroidism, hypertension.  She was diagnosed with colon cancer with metastasis who has been on chemotherapy.  She presents to the emergency department because of symptoms of worsening cough along with shortness of breath.  Cough was aggressive mostly dry in nature resulting in feeling of shortness of breath.  She started having chest muscle pains because of her persistent coughing.  In ER she was found to be afebrile.  She was found to be hypoxic with oxygen saturation 75%.  She was placed on 6 L of nasal cannula oxygen.  When I saw her she was saturating 97%, supplemental oxygen was cut down to 2 L/min.  Patient claims that she has history of asthma but was not using her inhalers on regular basis.  In the ER she was tested positive for COVID-19 pneumonia.  CT scan of chest was done which showed left lower lobe atelectasis along with diffuse subpleural fibrosis with groundglass opacities.  No filling defects noted to suggest any pulmonary embolism.  Subjective:     Patient seen and examined.  Discussed with nursing staff.  Family at the bedside.  She remains on 2 L oxygen.  SpO2 on room air at rest was 87%.  However she is feeling better.  She coughed up mucus yesterday and felt better.  Appetite is fair.  Walking to the bathroom.    04/15/2024: Patient seen and examined.  Discussed with the nursing staff.  Family at the bedside.  She is on 2 L oxygen.  She is breathing better.  Just somewhat tired.  Some sputum production.  Appetite is fair.  She is walking to the bathroom.  She tells me that she smoked in the remote past.  She has never been vaccinated and never had COVID before.    On 4/14/2024: Patient examined at bedside  Coughing has shown some improvement.  Shortness of breath better    Patient Active Problem List   Diagnosis    Acquired hypothyroidism    Anxiety    Essential hypertension    Lungs:   Few scattered crackles.  Good air entry.   CV:  Regular rate and rhythm,no murmur, click, rub or gallop   Abdomen:   Soft, non-tender. bowel sounds normal. non-distended   Extremities: No cyanosis or edema   Skin: Skin color, texture, turgor normal. no acute rash or lesions   Lymph nodes: Cervical and supraclavicular normal   Musculoskeletal: No swelling or deformity   Lines/Devices:  Intact, no erythema, drainage or tenderness   Psych: Alert and oriented, normal mood affect given the setting       Impression:   This is an elderly female with known history of hypertension, hypothyroidism, hyperlipidemia.  She has colon cancer with metastasis and was getting chemotherapy.  She developed aggressive cough along with shortness of breath.  She was noted to be hypoxic saturating 87%.  She was tested positive for COVID-19.  Her CT scan of chest showed groundglass changes.    Plan:   1.  COVID-19 pneumonia:  Tested positive for COVID-19.  CT scan of chest showed groundglass changes.  Got started on systemic steroids along with nasal cannula oxygen, barcitinib 4 mg daily  Patient is also on doxycycline.    She is also on Symbicort and Zyrtec.  Currently on 2 L oxygen, SpO2 on room air at rest was 87%. Wean down oxygen to keep saturation more than 92%.  She gets more short winded when she is walking to the bathroom.  CRP today is less than 0.29.    2. Aggressive coughing:  Started on Robitussin with codeine  Coughing has shown improvement    3.  Colon cancer with metastasis  Management as per oncology    4.  Hypokalemia:  Getting potassium replaced.    Patient appears weak and deconditioned.  She may need rehab.    For DVT prophylaxis she is on Lovenox subcutaneously.  Thank you for involving me in the management of the patient  Discussed with the family members at the bedside.  More than 30 minutes spent with patient care.    This dictation was done by [employer] voice recognition software.  Often

## 2024-04-16 NOTE — CARE COORDINATION
DCP: home with dtr; pending PT/OT evals and recs; poss home O2 needs    1138: CM met with pt at bedside to discuss dispo. Pt reports she is not on home oxygen, but is agreeable to have it setup . Pt has no preference for DME company. CM will await a walk test for home O2 setup.

## 2024-04-16 NOTE — PROGRESS NOTES
Hospitalist Progress Note    NAME:   Taryn Corona   : 1945   MRN: [id number]     Date/Time: 2024 9:42 AM  Patient PCP: Afsaneh Alcaraz MD    Estimated discharge date: 48 hours  Barriers: Wean O2, IV steroids    Hospital Course:  Taryn Corona is a 79-year-old female with hypertension, asthma, hyperlipidemia, hypothyroidism, colon cancer with metastasis on chemotherapy admitted 4/10/2024 for cough and shortness of breath.  Patient hypoxic and requiring 5 L via nasal cannula, weaned to 2.5 L via nasal cannula.  Patient tested positive for COVID at home. Pt did not receive any COVID vaccines. CTA chest shows left lower lobe atelectasis, diffuse subpleural fibrosis with groundglass opacities.  Pulmonary embolism ruled out.  Continue DuoNebs.  Continue IV steroids and baricitinib.  Pulmonology and oncology following. Patient O2 saturation 87% on RA at rest, placed on 2L NC  with saturation of 91%. Patient will require home O2 at discharge.     Assessment / Plan:  Acute respiratory failure with hypoxia  COVID-19, pneumonitis  Leukocytosis  - WBC  19.3  - Continue supplementation with oxygen, titrating SpO2 90 to 95%.  2.5 L via NC. Continue to wean as tolerated  - Continue IV steroid and baricitinib  - Continue DuoNebs  - Pulmonary following, appreciate recommendations  - Will need ambulatory oximetry for home O2    Chronic obstructive asthma  -Continue Symbicort (substituted for Advair)  - Continue DuoNebs    Hypokalemia  - Resolved  - Continue to monitor    Mild normocytic anemia  - Hemoglobin 10.1>9.5>9.7>9.6-->10.3, will continue monitor    Hypertension  - Blood pressure at goal  - Continue amlodipine, hydralazine, losartan    Hypothyroidism  - Continue levothyroxine    Carcinoma of the colon with metastasis  -follows with Dr. Gallo;  -She completed 3 cycles of CapeOx after which the regimen was discontinued due to persistent/severe diarrhea  -Started on FOLFOX on 2024 which she is

## 2024-04-17 VITALS
HEART RATE: 86 BPM | HEIGHT: 62 IN | TEMPERATURE: 97.7 F | DIASTOLIC BLOOD PRESSURE: 84 MMHG | BODY MASS INDEX: 35.15 KG/M2 | RESPIRATION RATE: 16 BRPM | WEIGHT: 191 LBS | OXYGEN SATURATION: 95 % | SYSTOLIC BLOOD PRESSURE: 140 MMHG

## 2024-04-17 LAB
ANION GAP SERPL CALC-SCNC: 10 MMOL/L (ref 5–15)
BACTERIA SPEC CULT: NORMAL
BACTERIA SPEC CULT: NORMAL
BASOPHILS # BLD: 0 K/UL (ref 0–0.1)
BASOPHILS NFR BLD: 0 % (ref 0–1)
BUN SERPL-MCNC: 22 MG/DL (ref 6–20)
BUN/CREAT SERPL: 32 (ref 12–20)
CA-I BLD-MCNC: 9.3 MG/DL (ref 8.5–10.1)
CHLORIDE SERPL-SCNC: 100 MMOL/L (ref 97–108)
CO2 SERPL-SCNC: 24 MMOL/L (ref 21–32)
CREAT SERPL-MCNC: 0.68 MG/DL (ref 0.55–1.02)
DIFFERENTIAL METHOD BLD: ABNORMAL
EOSINOPHIL # BLD: 0 K/UL (ref 0–0.4)
EOSINOPHIL NFR BLD: 0 % (ref 0–7)
ERYTHROCYTE [DISTWIDTH] IN BLOOD BY AUTOMATED COUNT: 15.4 % (ref 11.5–14.5)
GLUCOSE SERPL-MCNC: 158 MG/DL (ref 65–100)
HCT VFR BLD AUTO: 30.2 % (ref 35–47)
HGB BLD-MCNC: 9.8 G/DL (ref 11.5–16)
IMM GRANULOCYTES # BLD AUTO: 0 K/UL
IMM GRANULOCYTES NFR BLD AUTO: 0 %
LYMPHOCYTES # BLD: 0.3 K/UL (ref 0.8–3.5)
LYMPHOCYTES NFR BLD: 2 % (ref 12–49)
Lab: NORMAL
Lab: NORMAL
MCH RBC QN AUTO: 27.2 PG (ref 26–34)
MCHC RBC AUTO-ENTMCNC: 32.5 G/DL (ref 30–36.5)
MCV RBC AUTO: 83.9 FL (ref 80–99)
METAMYELOCYTES NFR BLD MANUAL: 1 %
MONOCYTES # BLD: 0.9 K/UL (ref 0–1)
MONOCYTES NFR BLD: 5 % (ref 5–13)
MYELOCYTES NFR BLD MANUAL: 2 %
NEUTS SEG # BLD: 15.3 K/UL (ref 1.8–8)
NEUTS SEG NFR BLD: 90 % (ref 32–75)
NRBC # BLD: 0 K/UL (ref 0–0.01)
NRBC BLD-RTO: 0 PER 100 WBC
PLATELET # BLD AUTO: 329 K/UL (ref 150–400)
PMV BLD AUTO: 10.7 FL (ref 8.9–12.9)
POTASSIUM SERPL-SCNC: 3.2 MMOL/L (ref 3.5–5.1)
RBC # BLD AUTO: 3.6 M/UL (ref 3.8–5.2)
RBC MORPH BLD: ABNORMAL
SODIUM SERPL-SCNC: 134 MMOL/L (ref 136–145)
WBC # BLD AUTO: 17 K/UL (ref 3.6–11)

## 2024-04-17 PROCEDURE — 6370000000 HC RX 637 (ALT 250 FOR IP): Performed by: INTERNAL MEDICINE

## 2024-04-17 PROCEDURE — 6370000000 HC RX 637 (ALT 250 FOR IP): Performed by: HOSPITALIST

## 2024-04-17 PROCEDURE — 85025 COMPLETE CBC W/AUTO DIFF WBC: CPT

## 2024-04-17 PROCEDURE — 36415 COLL VENOUS BLD VENIPUNCTURE: CPT

## 2024-04-17 PROCEDURE — 6360000002 HC RX W HCPCS: Performed by: NURSE PRACTITIONER

## 2024-04-17 PROCEDURE — 94761 N-INVAS EAR/PLS OXIMETRY MLT: CPT

## 2024-04-17 PROCEDURE — 2700000000 HC OXYGEN THERAPY PER DAY

## 2024-04-17 PROCEDURE — 6360000002 HC RX W HCPCS: Performed by: HOSPITALIST

## 2024-04-17 PROCEDURE — 6370000000 HC RX 637 (ALT 250 FOR IP)

## 2024-04-17 PROCEDURE — 94640 AIRWAY INHALATION TREATMENT: CPT

## 2024-04-17 PROCEDURE — 80048 BASIC METABOLIC PNL TOTAL CA: CPT

## 2024-04-17 PROCEDURE — 2580000003 HC RX 258: Performed by: HOSPITALIST

## 2024-04-17 RX ORDER — AMOXICILLIN AND CLAVULANATE POTASSIUM 875; 125 MG/1; MG/1
1 TABLET, FILM COATED ORAL 2 TIMES DAILY
Qty: 14 TABLET | Refills: 0 | Status: SHIPPED | OUTPATIENT
Start: 2024-04-17 | End: 2024-04-24

## 2024-04-17 RX ORDER — DOXYCYCLINE HYCLATE 100 MG
100 TABLET ORAL 2 TIMES DAILY
Qty: 14 TABLET | Refills: 0 | Status: SHIPPED | OUTPATIENT
Start: 2024-04-17 | End: 2024-04-24

## 2024-04-17 RX ADMIN — GUAIFENESIN AND CODEINE PHOSPHATE 5 ML: 10; 100 LIQUID ORAL at 08:38

## 2024-04-17 RX ADMIN — BARICITINIB 4 MG: 2 TABLET, FILM COATED ORAL at 08:38

## 2024-04-17 RX ADMIN — DOXYCYCLINE HYCLATE 100 MG: 100 CAPSULE ORAL at 11:30

## 2024-04-17 RX ADMIN — FAMOTIDINE 20 MG: 20 TABLET, FILM COATED ORAL at 08:38

## 2024-04-17 RX ADMIN — METHYLPREDNISOLONE SODIUM SUCCINATE 40 MG: 40 INJECTION INTRAMUSCULAR; INTRAVENOUS at 09:38

## 2024-04-17 RX ADMIN — DOXYCYCLINE HYCLATE 100 MG: 100 CAPSULE ORAL at 00:04

## 2024-04-17 RX ADMIN — SODIUM CHLORIDE, PRESERVATIVE FREE 10 ML: 5 INJECTION INTRAVENOUS at 08:38

## 2024-04-17 RX ADMIN — HYDRALAZINE HYDROCHLORIDE 25 MG: 25 TABLET ORAL at 08:38

## 2024-04-17 RX ADMIN — LEVOTHYROXINE SODIUM 75 MCG: 0.03 TABLET ORAL at 06:01

## 2024-04-17 RX ADMIN — GUAIFENESIN AND CODEINE PHOSPHATE 5 ML: 10; 100 LIQUID ORAL at 00:04

## 2024-04-17 RX ADMIN — SERTRALINE HYDROCHLORIDE 50 MG: 50 TABLET ORAL at 09:38

## 2024-04-17 RX ADMIN — ENOXAPARIN SODIUM 40 MG: 100 INJECTION SUBCUTANEOUS at 08:39

## 2024-04-17 RX ADMIN — LOSARTAN POTASSIUM 50 MG: 50 TABLET, FILM COATED ORAL at 08:38

## 2024-04-17 RX ADMIN — METHYLPREDNISOLONE SODIUM SUCCINATE 40 MG: 40 INJECTION INTRAMUSCULAR; INTRAVENOUS at 02:13

## 2024-04-17 RX ADMIN — Medication 2 PUFF: at 07:54

## 2024-04-17 RX ADMIN — AMLODIPINE BESYLATE 5 MG: 5 TABLET ORAL at 08:38

## 2024-04-17 RX ADMIN — ACETAMINOPHEN 650 MG: 325 TABLET ORAL at 11:27

## 2024-04-17 ASSESSMENT — PAIN DESCRIPTION - LOCATION: LOCATION: HEAD

## 2024-04-17 ASSESSMENT — PAIN SCALES - GENERAL: PAINLEVEL_OUTOF10: 10

## 2024-04-17 ASSESSMENT — PAIN DESCRIPTION - DESCRIPTORS: DESCRIPTORS: ACHING

## 2024-04-17 NOTE — PROGRESS NOTES
..4 Eyes Skin Assessment     NAME:  Taryn Corona  YOB: 1945  MEDICAL RECORD NUMBER:  [id number]    The patient is being assessed for  Other Per unit protocol    I agree that at least one RN has performed a thorough Head to Toe Skin Assessment on the patient. ALL assessment sites listed below have been assessed.      Areas assessed by both nurses:    Head, Face, Ears, Shoulders, Back, Chest, Arms, Elbows, Hands, Sacrum. Buttock, Coccyx, Ischium, and Legs. Feet and Heels        Does the Patient have a Wound? No noted wound(s)       Mook Prevention initiated by RN: Yes  Wound Care Orders initiated by RN: No    Pressure Injury (Stage 3,4, Unstageable, DTI, NWPT, and Complex wounds) if present, place Wound referral order by RN under : No    New Ostomies, if present place, Ostomy referral order under : No     Nurse 1 eSignature: Electronically signed by Christopher Shaw RN on 4/17/24 at 11:40 AM EDT    **SHARE this note so that the co-signing nurse can place an eSignature**    Nurse 2 eSignature: Electronically signed by Rosalba Evangelista RN on 4/17/24 at 11:42 AM EDT

## 2024-04-17 NOTE — PROGRESS NOTES
..Discharge plan of care/case management plan validated with provider discharge order.     Patient is discharged home. Patient and daughter(Alva) were informed about discharge instructions and educated on how to use oxygen safely at home. They verbalized understanding the instructions given.

## 2024-04-17 NOTE — DISCHARGE SUMMARY
Discharge Summary    Name: Taryn Corona  [id number]  YOB: 1945 (Age: 79 y.o.)   Date of Admission: 4/10/2024  Date of Discharge: 4/17/2024  Attending Physician: Nate Mcelroy MD    Discharge Diagnosis:   Principal Problem:    Acute respiratory failure with hypoxia (HCC)  Active Problems:    Pneumonia due to COVID-19 virus  Resolved Problems:    * No resolved hospital problems. *       Consultations:  IP CONSULT TO PHARMACY  IP CONSULT TO ONCOLOGY  IP CONSULT TO PULMONOLOGY      Brief Admission History/Reason for Admission Per Sai Morel MD:     Taryn Corona is a 79-year-old female with hypertension, asthma, hyperlipidemia, hypothyroidism, colon cancer with metastasis on chemotherapy admitted 4/10/2024 for cough and shortness of breath.  Patient hypoxic and requiring 5 L via nasal cannula, weaned to 2.5 L via nasal cannula.  Patient tested positive for COVID at home. Pt did not receive any COVID vaccines. CTA chest shows left lower lobe atelectasis, diffuse subpleural fibrosis with groundglass opacities.  Pulmonary embolism ruled out.  Continue DuoNebs.  Continue IV steroids and baricitinib.  Pulmonology and oncology following. Patient O2 saturation 87% on RA at rest, placed on 2L NC  with saturation of 91%. Patient has portable O2 at the bedside and an order for a small rechargeable delivery system has been ordered. Please continue amoxicillin and doxycycline as prescribed. Please follow-up with primary care provider on 04/22/24 at 2:50, please call and reschedule if this appointment time does not work for your schedule. Please follow-up with pulmonary in 2 weeks for repeat chest x-ray to ensure resolution. Please follow-up with oncology as scheduled. Patient is clinically stable for discharge. Patient declined HH or placement.    Discharge Exam:  Patient seen and examined by me on discharge day.  Pertinent Findings:  Patient Vitals for the past 24 hrs:

## 2024-04-17 NOTE — DISCHARGE INSTR - COC
Continuity of Care Form    Patient Name: Taryn Salazar   :  1945  MRN:  [id number]    Admit date:  4/10/2024  Discharge date:  2024    Code Status Order: Full Code   Advance Directives:     Admitting Physician:  aSi Morel MD  PCP: Afsaneh Alcaraz MD    Discharging Nurse: Christopher Shaw  Discharging Hospital Unit/Room#: 562/01  Discharging Unit Phone Number:     Emergency Contact:   Extended Emergency Contact Information  Primary Emergency Contact: Alva Salazar   [facility]  Home Phone: [phone]  Mobile Phone: [phone]  Relation: Child  Secondary Emergency Contact: MICHOACANO SALAZAR  Mobile Phone: [phone]  Relation: Child  Preferred language: English   needed? No    Past Surgical History:  Past Surgical History:   Procedure Laterality Date    BREAST SURGERY      cyst removed    COLONOSCOPY N/A 2023    COLONOSCOPY performed by Kiki Myers MD at [facility] ENDOSCOPY    COLONOSCOPY N/A 2023    COLONOSCOPY WITH BIOPSY performed by Kiki Myers MD at [facility] ENDOSCOPY    UPPER GASTROINTESTINAL ENDOSCOPY N/A 2023    EGD POLYP SNARE performed by Kiki Myers MD at [facility] ENDOSCOPY       Immunization History:     There is no immunization history on file for this patient.    Active Problems:  Patient Active Problem List   Diagnosis Code    Acquired hypothyroidism E03.9    Anxiety F41.9    Essential hypertension I10    Obesity, morbid (HCC) E66.01    Multinodular goiter (nontoxic) E04.2    Dyspnea R06.00    Asthma exacerbation J45.901    Mild obstructive sleep apnea G47.33    Pneumonia due to COVID-19 virus U07.1, J12.82    Acute respiratory failure with hypoxia (HCC) J96.01       Isolation/Infection:   Isolation            Droplet Plus          Patient Infection Status       Infection Onset Added Last Indicated Last Indicated By Review Planned Expiration Resolved Resolved By    COVID-19 24 COVID-19, Rapid 04/15/24 04/19/24                          Nurse Assessment:  Last Vital Signs: BP (!) 140/84   Pulse 86   Temp 97.7 °F (36.5 °C) (Oral)   Resp 16   Ht 1.575 m (5' 2\")   Wt 86.6 kg (191 lb)   SpO2 95%   BMI 34.93 kg/m²     Last documented pain score (0-10 scale): Pain Level: 0  Last Weight:   Wt Readings from Last 1 Encounters:   04/10/24 86.6 kg (191 lb)     Mental Status:  oriented    IV Access:  - None    Nursing Mobility/ADLs:  Walking   Assisted  Transfer  Assisted  Bathing  Assisted  Dressing  Assisted  Toileting  Independent  Feeding  Independent  Med Admin  Independent  Med Delivery   whole    Wound Care Documentation and Therapy:        Elimination:  Continence:   Bowel: Yes  Bladder: Yes  Urinary Catheter: None   Colostomy/Ileostomy/Ileal Conduit: No       Date of Last BM: 2024    Intake/Output Summary (Last 24 hours) at 2024 1049  Last data filed at 2024 0007  Gross per 24 hour   Intake 236 ml   Output 550 ml   Net -314 ml     I/O last 3 completed shifts:  In: 236 [P.O.:236]  Out: 1000 [Urine:1000]    Safety Concerns:     At Risk for Falls    Impairments/Disabilities:      None    Nutrition Therapy:  Current Nutrition Therapy:   - Oral Diet:  General    Routes of Feeding: Oral  Liquids: No Restrictions  Daily Fluid Restriction: no  Last Modified Barium Swallow with Video (Video Swallowing Test): not done    Treatments at the Time of Hospital Discharge:   Respiratory Treatments: ***  Oxygen Therapy:  is on oxygen at 2 L/min per nasal cannula.  Ventilator:    - No ventilator support    Rehab Therapies: ***  Weight Bearing Status/Restrictions: {[facility] Weight Bearin}  Other Medical Equipment (for information only, NOT a DME order):  {EQUIPMENT:[id number]}  Other Treatments: ***    Patient's personal belongings (please select all that are sent with patient):  {[facility] DME Belongings:[id number]}    RN SIGNATURE:  Electronically signed by Christopher Shaw RN on 24 at 10:50 AM EDT

## 2024-04-17 NOTE — PLAN OF CARE
Problem: Discharge Planning  Goal: Discharge to home or other facility with appropriate resources  Outcome: Progressing     Problem: Pain  Goal: Verbalizes/displays adequate comfort level or baseline comfort level  Outcome: Progressing     Problem: ABCDS Injury Assessment  Goal: Absence of physical injury  Outcome: Progressing     Problem: Safety - Adult  Goal: Free from fall injury  Outcome: Progressing     Problem: Skin/Tissue Integrity  Goal: Absence of new skin breakdown  Description: 1.  Monitor for areas of redness and/or skin breakdown  2.  Assess vascular access sites hourly  3.  Every 4-6 hours minimum:  Change oxygen saturation probe site  4.  Every 4-6 hours:  If on nasal continuous positive airway pressure, respiratory therapy assess nares and determine need for appliance change or resting period.  Outcome: Progressing     Problem: Respiratory - Adult  Goal: Achieves optimal ventilation and oxygenation  Outcome: Progressing

## 2024-04-17 NOTE — PROGRESS NOTES
Spiritual Care Assessment/Progress Note  [facility]    Name: Taryn Corona MRN: [id number]    Age: 79 y.o.     Sex: female   Language: English     Date: 4/17/2024            Total Time Calculated: 17 min              Spiritual Assessment begun in [facility]  Service Provided For:: Patient and family together  Referral/Consult From:: Rounding  Encounter Overview/Reason : Initial Encounter    Spiritual beliefs:      [] Involved in a chely tradition/spiritual practice:      [] Supported by a chely community:      [] Claims no spiritual orientation:      [] Seeking spiritual identity:           [] Adheres to an individual form of spirituality:      [x] Not able to assess:                Identified resources for coping and support system:   Support System: Children       [] Prayer                  [] Devotional reading               [] Music                  [] Guided Imagery     [] Pet visits                                        [] Other: (COMMENT)     Specific area/focus of visit   Encounter:    Crisis:    Spiritual/Emotional needs: Type: Spiritual Support  Ritual, Rites and Sacraments:    Grief, Loss, and Adjustments:    Ethics/Mediation:    Behavioral Health:    Palliative Care:    Advance Care Planning:      Plan/Referrals: Other (Comment) ( is available if needed)    Narrative:  visited pt Taryn Corona while rounding on [facility] for the purpose of making a spiritual assessment. Pt's chart reviewed. Pt is resting in bed with daughter present, welcoming of spiritual health visit. Pt shares briefly of course of hospitalization and desire to return home, this idea offering her hope at this time. Pt reflects on what has provided some comfort at this time.  provides peaceful and supportive presence to pt and family and engages in active listening as pt shares in reflection.  encourages pt's identification of feelings and coping resources. Informs pt of

## 2024-04-17 NOTE — CARE COORDINATION
DCP: home with dtr, declined HH; home O2 setup with [facility] ([facility])    Transition of Care Plan:    RUR: 14%  Prior Level of Functioning: independent  Disposition: home with dtr and home O2 setup with [employer] [facility]; HH declined  If SNF or IPR: Date FOC offered: n/a  Date FOC received: n/a  Accepting facility: n/a  Date authorization started with reference number: n/a  Date authorization received and expires: n/a  Follow up appointments: unit secretary to setup as needed  DME needed: home O2  Transportation at discharge: arranged by pt  IM/IMM Medicare/ letter given: yes  Is patient a  and connected with VA? no  If yes, was  transfer form completed and VA notified? N/a  Caregiver Contact: n/a  Discharge Caregiver contacted prior to discharge? N/a  Care Conference needed? no  Barriers to discharge: none

## (undated) DEVICE — SNARE ENDOSCP L240CM LOOP W13MM SHTH DIA2.4MM SM OVL FLX

## (undated) DEVICE — CANNULA NSL O2 AD 7 FT END-TIDAL CARBON DIOX VENTFLO

## (undated) DEVICE — MASK O2 MED AD 7 FT 3 IN 1 W/ STD CONN LTX

## (undated) DEVICE — KIT ENDOSCOPIC  PROC VIA

## (undated) DEVICE — TRAP SPEC POLYP REM STRNR CLN DSGN MAGNIFYING WIND DISP

## (undated) DEVICE — Device: Brand: SPOT EX ENDOSCOPIC TATTOO

## (undated) DEVICE — NEEDLE SCLERO 23GA L240CM OD064MM ID032MM CLR INTERJECT